# Patient Record
Sex: MALE | Race: WHITE | NOT HISPANIC OR LATINO | Employment: FULL TIME | URBAN - METROPOLITAN AREA
[De-identification: names, ages, dates, MRNs, and addresses within clinical notes are randomized per-mention and may not be internally consistent; named-entity substitution may affect disease eponyms.]

---

## 2012-11-20 LAB — CRC RECOMMENDATION EXT: NORMAL

## 2017-11-03 ENCOUNTER — HISTORICAL (OUTPATIENT)
Dept: LAB | Facility: HOSPITAL | Age: 52
End: 2017-11-03

## 2017-11-03 LAB
ABS NEUT (OLG): 2.62 X10(3)/MCL (ref 2.1–9.2)
ALBUMIN SERPL-MCNC: 4.4 GM/DL (ref 3.4–5)
ALBUMIN/GLOB SERPL: 1.5 {RATIO}
ALP SERPL-CCNC: 110 UNIT/L (ref 50–136)
ALT SERPL-CCNC: 75 UNIT/L (ref 12–78)
AST SERPL-CCNC: 35 UNIT/L (ref 15–37)
BASOPHILS # BLD AUTO: 0 X10(3)/MCL (ref 0–0.2)
BASOPHILS NFR BLD AUTO: 1 %
BILIRUB SERPL-MCNC: 1 MG/DL (ref 0.2–1)
BILIRUBIN DIRECT+TOT PNL SERPL-MCNC: 0.2 MG/DL (ref 0–0.2)
BILIRUBIN DIRECT+TOT PNL SERPL-MCNC: 0.8 MG/DL (ref 0–0.8)
BUN SERPL-MCNC: 16 MG/DL (ref 7–18)
CALCIUM SERPL-MCNC: 9.4 MG/DL (ref 8.5–10.1)
CHLORIDE SERPL-SCNC: 105 MMOL/L (ref 98–107)
CO2 SERPL-SCNC: 28 MMOL/L (ref 21–32)
CREAT SERPL-MCNC: 1.09 MG/DL (ref 0.7–1.3)
EOSINOPHIL # BLD AUTO: 0.1 X10(3)/MCL (ref 0–0.9)
EOSINOPHIL NFR BLD AUTO: 2 %
ERYTHROCYTE [DISTWIDTH] IN BLOOD BY AUTOMATED COUNT: 13.2 % (ref 11.5–17)
GLOBULIN SER-MCNC: 3 GM/DL (ref 2.4–3.5)
GLUCOSE SERPL-MCNC: 105 MG/DL (ref 74–106)
HCT VFR BLD AUTO: 50.1 % (ref 42–52)
HGB BLD-MCNC: 16.3 GM/DL (ref 14–18)
LYMPHOCYTES # BLD AUTO: 1.8 X10(3)/MCL (ref 0.6–4.6)
LYMPHOCYTES NFR BLD AUTO: 36 %
MCH RBC QN AUTO: 30.5 PG (ref 27–31)
MCHC RBC AUTO-ENTMCNC: 32.5 GM/DL (ref 33–36)
MCV RBC AUTO: 93.8 FL (ref 80–94)
MONOCYTES # BLD AUTO: 0.5 X10(3)/MCL (ref 0.1–1.3)
MONOCYTES NFR BLD AUTO: 10 %
NEUTROPHILS # BLD AUTO: 2.62 X10(3)/MCL (ref 2.1–9.2)
NEUTROPHILS NFR BLD AUTO: 51 %
PLATELET # BLD AUTO: 184 X10(3)/MCL (ref 130–400)
PMV BLD AUTO: 10.5 FL (ref 9.4–12.4)
POTASSIUM SERPL-SCNC: 4.5 MMOL/L (ref 3.5–5.1)
PROT SERPL-MCNC: 7.4 GM/DL (ref 6.4–8.2)
PSA SERPL-MCNC: 1.31 NG/ML (ref 0–4)
RBC # BLD AUTO: 5.34 X10(6)/MCL (ref 4.7–6.1)
SODIUM SERPL-SCNC: 143 MMOL/L (ref 136–145)
TSH SERPL-ACNC: 2.09 MIU/ML (ref 0.36–3.74)
WBC # SPEC AUTO: 5.1 X10(3)/MCL (ref 4.5–11.5)

## 2018-01-16 LAB
INFLUENZA A ANTIGEN, POC: POSITIVE
INFLUENZA B ANTIGEN, POC: NEGATIVE

## 2018-12-12 ENCOUNTER — HISTORICAL (OUTPATIENT)
Dept: LAB | Facility: HOSPITAL | Age: 53
End: 2018-12-12

## 2018-12-12 LAB
ABS NEUT (OLG): 2.76 X10(3)/MCL (ref 2.1–9.2)
ALBUMIN SERPL-MCNC: 3.9 GM/DL (ref 3.4–5)
ALBUMIN/GLOB SERPL: 1.3 RATIO (ref 1.1–2)
ALP SERPL-CCNC: 98 UNIT/L (ref 50–136)
ALT SERPL-CCNC: 48 UNIT/L (ref 12–78)
APPEARANCE, UA: CLEAR
AST SERPL-CCNC: 33 UNIT/L (ref 15–37)
BACTERIA SPEC CULT: ABNORMAL /HPF
BASOPHILS # BLD AUTO: 0 X10(3)/MCL (ref 0–0.2)
BASOPHILS NFR BLD AUTO: 1 %
BILIRUB SERPL-MCNC: 0.8 MG/DL (ref 0.2–1)
BILIRUB UR QL STRIP: NEGATIVE
BILIRUBIN DIRECT+TOT PNL SERPL-MCNC: 0.2 MG/DL (ref 0–0.5)
BILIRUBIN DIRECT+TOT PNL SERPL-MCNC: 0.6 MG/DL (ref 0–0.8)
BUN SERPL-MCNC: 14 MG/DL (ref 7–18)
CALCIUM SERPL-MCNC: 8.7 MG/DL (ref 8.5–10.1)
CHLORIDE SERPL-SCNC: 109 MMOL/L (ref 98–107)
CHOLEST SERPL-MCNC: 252 MG/DL (ref 0–200)
CHOLEST/HDLC SERPL: 5.4 {RATIO} (ref 0–5)
CO2 SERPL-SCNC: 26 MMOL/L (ref 21–32)
COLOR UR: ABNORMAL
CREAT SERPL-MCNC: 0.93 MG/DL (ref 0.7–1.3)
EOSINOPHIL # BLD AUTO: 0.2 X10(3)/MCL (ref 0–0.9)
EOSINOPHIL NFR BLD AUTO: 3 %
ERYTHROCYTE [DISTWIDTH] IN BLOOD BY AUTOMATED COUNT: 13.3 % (ref 11.5–17)
EST. AVERAGE GLUCOSE BLD GHB EST-MCNC: 114 MG/DL
GLOBULIN SER-MCNC: 2.9 GM/DL (ref 2.4–3.5)
GLUCOSE (UA): NEGATIVE
GLUCOSE SERPL-MCNC: 109 MG/DL (ref 74–106)
HBA1C MFR BLD: 5.6 % (ref 4.2–6.3)
HCT VFR BLD AUTO: 48.8 % (ref 42–52)
HDLC SERPL-MCNC: 47 MG/DL (ref 35–60)
HGB BLD-MCNC: 15.8 GM/DL (ref 14–18)
HGB UR QL STRIP: NEGATIVE
KETONES UR QL STRIP: NEGATIVE
LDLC SERPL CALC-MCNC: 143 MG/DL (ref 0–129)
LEUKOCYTE ESTERASE UR QL STRIP: NEGATIVE
LYMPHOCYTES # BLD AUTO: 1.6 X10(3)/MCL (ref 0.6–4.6)
LYMPHOCYTES NFR BLD AUTO: 32 %
MCH RBC QN AUTO: 29.9 PG (ref 27–31)
MCHC RBC AUTO-ENTMCNC: 32.4 GM/DL (ref 33–36)
MCV RBC AUTO: 92.2 FL (ref 80–94)
MONOCYTES # BLD AUTO: 0.4 X10(3)/MCL (ref 0.1–1.3)
MONOCYTES NFR BLD AUTO: 8 %
MUCOUS THREADS URNS QL MICRO: SLIGHT
NEUTROPHILS # BLD AUTO: 2.76 X10(3)/MCL (ref 2.1–9.2)
NEUTROPHILS NFR BLD AUTO: 56 %
NITRITE UR QL STRIP: NEGATIVE
PH UR STRIP: 5 [PH] (ref 5–9)
PLATELET # BLD AUTO: 165 X10(3)/MCL (ref 130–400)
PMV BLD AUTO: 10.8 FL (ref 9.4–12.4)
POTASSIUM SERPL-SCNC: 4.1 MMOL/L (ref 3.5–5.1)
PROT SERPL-MCNC: 6.8 GM/DL (ref 6.4–8.2)
PROT UR QL STRIP: NEGATIVE
PSA SERPL-MCNC: 1.53 NG/ML (ref 0–4)
RBC # BLD AUTO: 5.29 X10(6)/MCL (ref 4.7–6.1)
RBC #/AREA URNS HPF: ABNORMAL /HPF
SODIUM SERPL-SCNC: 143 MMOL/L (ref 136–145)
SP GR UR STRIP: 1.02 (ref 1–1.03)
SQUAMOUS EPITHELIAL, UA: ABNORMAL
TRIGL SERPL-MCNC: 311 MG/DL (ref 30–150)
TSH SERPL-ACNC: 1.82 MIU/L (ref 0.36–3.74)
UA WBC MAN: ABNORMAL /HPF
UROBILINOGEN UR STRIP-ACNC: 0.2
VLDLC SERPL CALC-MCNC: 62 MG/DL
WBC # SPEC AUTO: 4.9 X10(3)/MCL (ref 4.5–11.5)

## 2019-04-03 ENCOUNTER — HISTORICAL (OUTPATIENT)
Dept: LAB | Facility: HOSPITAL | Age: 54
End: 2019-04-03

## 2019-04-03 LAB
CHOLEST SERPL-MCNC: 257 MG/DL (ref 0–200)
CHOLEST/HDLC SERPL: 4.4 {RATIO} (ref 0–5)
HDLC SERPL-MCNC: 59 MG/DL (ref 35–60)
LDLC SERPL CALC-MCNC: 165 MG/DL (ref 0–129)
TRIGL SERPL-MCNC: 165 MG/DL (ref 30–150)
VLDLC SERPL CALC-MCNC: 33 MG/DL

## 2019-07-03 ENCOUNTER — HISTORICAL (OUTPATIENT)
Dept: LAB | Facility: HOSPITAL | Age: 54
End: 2019-07-03

## 2019-07-03 LAB
CHOLEST SERPL-MCNC: 174 MG/DL (ref 0–199)
CHOLEST/HDLC SERPL: 3 MG/DL (ref 0–8)
HDLC SERPL-MCNC: 68 MG/DL
LDLC SERPL CALC-MCNC: 81 MG/DL (ref 0–129)
TRIGL SERPL-MCNC: 126 MG/DL (ref 0–149)
VLDLC SERPL CALC-MCNC: 25 MG/DL

## 2020-06-04 ENCOUNTER — HISTORICAL (OUTPATIENT)
Dept: ADMINISTRATIVE | Facility: HOSPITAL | Age: 55
End: 2020-06-04

## 2020-06-04 LAB
ABS NEUT (OLG): 2.94 X10(3)/MCL (ref 2.1–9.2)
ALBUMIN SERPL-MCNC: 4.2 GM/DL (ref 3.5–5)
ALBUMIN/GLOB SERPL: 1.4 RATIO (ref 1.1–2)
ALP SERPL-CCNC: 123 UNIT/L (ref 40–150)
ALT SERPL-CCNC: 70 UNIT/L (ref 0–55)
APPEARANCE, UA: CLEAR
AST SERPL-CCNC: 40 UNIT/L (ref 5–34)
BACTERIA SPEC CULT: NORMAL /HPF
BASOPHILS # BLD AUTO: 0 X10(3)/MCL (ref 0–0.2)
BASOPHILS NFR BLD AUTO: 1 %
BILIRUB SERPL-MCNC: 0.6 MG/DL
BILIRUB UR QL STRIP: NEGATIVE
BILIRUBIN DIRECT+TOT PNL SERPL-MCNC: 0.2 MG/DL (ref 0–0.5)
BILIRUBIN DIRECT+TOT PNL SERPL-MCNC: 0.4 MG/DL (ref 0–0.8)
BUN SERPL-MCNC: 13.4 MG/DL (ref 8.4–25.7)
CALCIUM SERPL-MCNC: 9.7 MG/DL (ref 8.4–10.2)
CHLORIDE SERPL-SCNC: 109 MMOL/L (ref 98–107)
CHOLEST SERPL-MCNC: 188 MG/DL
CHOLEST/HDLC SERPL: 4 {RATIO} (ref 0–5)
CO2 SERPL-SCNC: 25 MMOL/L (ref 22–29)
COLOR UR: YELLOW
CREAT SERPL-MCNC: 0.83 MG/DL (ref 0.73–1.18)
EOSINOPHIL # BLD AUTO: 0.2 X10(3)/MCL (ref 0–0.9)
EOSINOPHIL NFR BLD AUTO: 3 %
ERYTHROCYTE [DISTWIDTH] IN BLOOD BY AUTOMATED COUNT: 13.1 % (ref 11.5–17)
EST. AVERAGE GLUCOSE BLD GHB EST-MCNC: 108.3 MG/DL
GLOBULIN SER-MCNC: 3 GM/DL (ref 2.4–3.5)
GLUCOSE (UA): NEGATIVE
GLUCOSE SERPL-MCNC: 111 MG/DL (ref 74–100)
HAV IGM SERPL QL IA: NONREACTIVE
HBA1C MFR BLD: 5.4 %
HBV CORE IGM SERPL QL IA: NONREACTIVE
HBV SURFACE AG SERPL QL IA: NONREACTIVE
HCT VFR BLD AUTO: 47.4 % (ref 42–52)
HCV AB SERPL QL IA: NONREACTIVE
HDLC SERPL-MCNC: 48 MG/DL (ref 35–60)
HEPATITIS PANEL INTERP: NORMAL
HGB BLD-MCNC: 15.3 GM/DL (ref 14–18)
HGB UR QL STRIP: NEGATIVE
KETONES UR QL STRIP: NEGATIVE
LDLC SERPL CALC-MCNC: 115 MG/DL (ref 50–140)
LEUKOCYTE ESTERASE UR QL STRIP: NEGATIVE
LYMPHOCYTES # BLD AUTO: 1.8 X10(3)/MCL (ref 0.6–4.6)
LYMPHOCYTES NFR BLD AUTO: 34 %
MCH RBC QN AUTO: 29.7 PG (ref 27–31)
MCHC RBC AUTO-ENTMCNC: 32.3 GM/DL (ref 33–36)
MCV RBC AUTO: 91.9 FL (ref 80–94)
MONOCYTES # BLD AUTO: 0.4 X10(3)/MCL (ref 0.1–1.3)
MONOCYTES NFR BLD AUTO: 7 %
NEUTROPHILS # BLD AUTO: 2.94 X10(3)/MCL (ref 2.1–9.2)
NEUTROPHILS NFR BLD AUTO: 55 %
NITRITE UR QL STRIP: NEGATIVE
PH UR STRIP: 5 [PH] (ref 5–9)
PLATELET # BLD AUTO: 170 X10(3)/MCL (ref 130–400)
PMV BLD AUTO: 11 FL (ref 9.4–12.4)
POTASSIUM SERPL-SCNC: 4.6 MMOL/L (ref 3.5–5.1)
PROT SERPL-MCNC: 7.2 GM/DL (ref 6.4–8.3)
PROT UR QL STRIP: NEGATIVE
RBC # BLD AUTO: 5.16 X10(6)/MCL (ref 4.7–6.1)
RBC #/AREA URNS HPF: NORMAL /[HPF]
SODIUM SERPL-SCNC: 141 MMOL/L (ref 136–145)
SP GR UR STRIP: 1.02 (ref 1–1.03)
SQUAMOUS EPITHELIAL, UA: NORMAL
TRIGL SERPL-MCNC: 126 MG/DL (ref 34–140)
TSH SERPL-ACNC: 0.84 UIU/ML (ref 0.35–4.94)
UROBILINOGEN UR STRIP-ACNC: 0.2
VLDLC SERPL CALC-MCNC: 25 MG/DL
WBC # SPEC AUTO: 5.4 X10(3)/MCL (ref 4.5–11.5)
WBC #/AREA URNS HPF: NORMAL /HPF

## 2021-08-09 ENCOUNTER — HISTORICAL (OUTPATIENT)
Dept: LAB | Facility: HOSPITAL | Age: 56
End: 2021-08-09

## 2021-08-09 LAB
ABS NEUT (OLG): 2.63 X10(3)/MCL (ref 2.1–9.2)
ALBUMIN SERPL-MCNC: 4.2 GM/DL (ref 3.5–5)
ALBUMIN/GLOB SERPL: 1.5 RATIO (ref 1.1–2)
ALP SERPL-CCNC: 119 UNIT/L (ref 40–150)
ALT SERPL-CCNC: 43 UNIT/L (ref 0–55)
AST SERPL-CCNC: 31 UNIT/L (ref 5–34)
BASOPHILS # BLD AUTO: 0.03 X10(3)/MCL (ref 0–0.2)
BASOPHILS NFR BLD AUTO: 0.7 % (ref 0–0.9)
BILIRUB SERPL-MCNC: 1 MG/DL (ref 0.2–1.2)
BILIRUBIN DIRECT+TOT PNL SERPL-MCNC: 0.3 MG/DL (ref 0–0.5)
BILIRUBIN DIRECT+TOT PNL SERPL-MCNC: 0.7 MG/DL (ref 0–0.8)
BUN SERPL-MCNC: 21.1 MG/DL (ref 8.4–25.7)
CALCIUM SERPL-MCNC: 9.6 MG/DL (ref 8.4–10.2)
CHLORIDE SERPL-SCNC: 107 MMOL/L (ref 98–107)
CHOLEST SERPL-MCNC: 178 MG/DL
CHOLEST/HDLC SERPL: 4 {RATIO} (ref 0–5)
CO2 SERPL-SCNC: 27 MMOL/L (ref 22–29)
CREAT SERPL-MCNC: 0.94 MG/DL (ref 0.72–1.25)
EOSINOPHIL # BLD AUTO: 0.13 X10(3)/MCL (ref 0–0.9)
EOSINOPHIL NFR BLD AUTO: 2.9 % (ref 0–6.5)
ERYTHROCYTE [DISTWIDTH] IN BLOOD BY AUTOMATED COUNT: 12.9 % (ref 11.5–17)
GLOBULIN SER-MCNC: 2.8 GM/DL (ref 2.4–3.5)
GLUCOSE SERPL-MCNC: 126 MG/DL (ref 74–100)
HCT VFR BLD AUTO: 47.8 % (ref 42–52)
HDLC SERPL-MCNC: 50 MG/DL (ref 40–60)
HGB BLD-MCNC: 15.6 GM/DL (ref 14–18)
IMM GRANULOCYTES # BLD AUTO: 0 10*3/UL (ref 0–0.02)
IMM GRANULOCYTES NFR BLD AUTO: 0 % (ref 0–0.43)
LDLC SERPL CALC-MCNC: 93 MG/DL (ref 50–140)
LYMPHOCYTES # BLD AUTO: 1.3 X10(3)/MCL (ref 0.6–4.6)
LYMPHOCYTES NFR BLD AUTO: 28.6 % (ref 16.2–38.3)
MCH RBC QN AUTO: 30.5 PG (ref 27–31)
MCHC RBC AUTO-ENTMCNC: 32.6 GM/DL (ref 33–36)
MCV RBC AUTO: 93.5 FL (ref 80–94)
MONOCYTES # BLD AUTO: 0.45 X10(3)/MCL (ref 0.1–1.3)
MONOCYTES NFR BLD AUTO: 9.9 % (ref 4.7–11.3)
NEUTROPHILS # BLD AUTO: 2.63 X10(3)/MCL (ref 2.1–9.2)
NEUTROPHILS NFR BLD AUTO: 57.9 % (ref 49.1–73.4)
NRBC BLD AUTO-RTO: 0 % (ref 0–0.2)
PLATELET # BLD AUTO: 166 X10(3)/MCL (ref 130–400)
PMV BLD AUTO: 10.2 FL (ref 7.4–10.4)
POTASSIUM SERPL-SCNC: 4.4 MMOL/L (ref 3.5–5.1)
PROT SERPL-MCNC: 7 GM/DL (ref 6.4–8.3)
PSA SERPL-MCNC: 1.58 NG/ML
RBC # BLD AUTO: 5.11 X10(6)/MCL (ref 4.7–6.1)
SODIUM SERPL-SCNC: 143 MMOL/L (ref 136–145)
TRIGL SERPL-MCNC: 173 MG/DL (ref 0–150)
TSH SERPL-ACNC: 0.77 UIU/ML (ref 0.35–4.94)
VLDLC SERPL CALC-MCNC: 35 MG/DL
WBC # SPEC AUTO: 4.5 X10(3)/MCL (ref 4.5–11.5)

## 2021-12-15 ENCOUNTER — HISTORICAL (OUTPATIENT)
Dept: RADIOLOGY | Facility: HOSPITAL | Age: 56
End: 2021-12-15

## 2022-04-12 ENCOUNTER — HISTORICAL (OUTPATIENT)
Dept: ADMINISTRATIVE | Facility: HOSPITAL | Age: 57
End: 2022-04-12
Payer: COMMERCIAL

## 2022-04-30 VITALS
BODY MASS INDEX: 28.73 KG/M2 | SYSTOLIC BLOOD PRESSURE: 154 MMHG | HEIGHT: 69 IN | OXYGEN SATURATION: 98 % | DIASTOLIC BLOOD PRESSURE: 90 MMHG | WEIGHT: 194 LBS

## 2022-06-13 ENCOUNTER — OFFICE VISIT (OUTPATIENT)
Dept: FAMILY MEDICINE | Facility: CLINIC | Age: 57
End: 2022-06-13
Payer: COMMERCIAL

## 2022-06-13 VITALS
HEIGHT: 67 IN | OXYGEN SATURATION: 96 % | BODY MASS INDEX: 28.64 KG/M2 | SYSTOLIC BLOOD PRESSURE: 126 MMHG | TEMPERATURE: 99 F | WEIGHT: 182.5 LBS | RESPIRATION RATE: 18 BRPM | HEART RATE: 80 BPM | DIASTOLIC BLOOD PRESSURE: 82 MMHG

## 2022-06-13 DIAGNOSIS — F90.9 ATTENTION DEFICIT HYPERACTIVITY DISORDER (ADHD), UNSPECIFIED ADHD TYPE: Primary | ICD-10-CM

## 2022-06-13 DIAGNOSIS — N52.9 ERECTILE DYSFUNCTION, UNSPECIFIED ERECTILE DYSFUNCTION TYPE: ICD-10-CM

## 2022-06-13 DIAGNOSIS — M79.671 RIGHT FOOT PAIN: ICD-10-CM

## 2022-06-13 PROBLEM — E78.5 HYPERLIPIDEMIA: Status: ACTIVE | Noted: 2022-06-13

## 2022-06-13 PROBLEM — Z00.00 WELLNESS EXAMINATION: Status: ACTIVE | Noted: 2022-06-13

## 2022-06-13 PROCEDURE — 3008F PR BODY MASS INDEX (BMI) DOCUMENTED: ICD-10-PCS | Mod: CPTII,,, | Performed by: FAMILY MEDICINE

## 2022-06-13 PROCEDURE — 3074F PR MOST RECENT SYSTOLIC BLOOD PRESSURE < 130 MM HG: ICD-10-PCS | Mod: CPTII,,, | Performed by: FAMILY MEDICINE

## 2022-06-13 PROCEDURE — 99213 PR OFFICE/OUTPT VISIT, EST, LEVL III, 20-29 MIN: ICD-10-PCS | Mod: ,,, | Performed by: FAMILY MEDICINE

## 2022-06-13 PROCEDURE — 3079F PR MOST RECENT DIASTOLIC BLOOD PRESSURE 80-89 MM HG: ICD-10-PCS | Mod: CPTII,,, | Performed by: FAMILY MEDICINE

## 2022-06-13 PROCEDURE — 3008F BODY MASS INDEX DOCD: CPT | Mod: CPTII,,, | Performed by: FAMILY MEDICINE

## 2022-06-13 PROCEDURE — 3074F SYST BP LT 130 MM HG: CPT | Mod: CPTII,,, | Performed by: FAMILY MEDICINE

## 2022-06-13 PROCEDURE — 99213 OFFICE O/P EST LOW 20 MIN: CPT | Mod: ,,, | Performed by: FAMILY MEDICINE

## 2022-06-13 PROCEDURE — 3079F DIAST BP 80-89 MM HG: CPT | Mod: CPTII,,, | Performed by: FAMILY MEDICINE

## 2022-06-13 RX ORDER — SILDENAFIL 50 MG/1
50 TABLET, FILM COATED ORAL DAILY
COMMUNITY
Start: 2021-12-18 | End: 2022-06-13 | Stop reason: SDUPTHER

## 2022-06-13 RX ORDER — SILDENAFIL 100 MG/1
100 TABLET, FILM COATED ORAL
COMMUNITY
Start: 2022-02-21 | End: 2022-06-13

## 2022-06-13 RX ORDER — ATORVASTATIN CALCIUM 20 MG/1
20 TABLET, FILM COATED ORAL DAILY
COMMUNITY
Start: 2022-06-11 | End: 2023-04-19 | Stop reason: SDUPTHER

## 2022-06-13 RX ORDER — MELOXICAM 15 MG/1
15 TABLET ORAL DAILY
Qty: 30 TABLET | Refills: 5 | Status: SHIPPED | OUTPATIENT
Start: 2022-06-13

## 2022-06-13 RX ORDER — DEXTROAMPHETAMINE SACCHARATE, AMPHETAMINE ASPARTATE, DEXTROAMPHETAMINE SULFATE AND AMPHETAMINE SULFATE 5; 5; 5; 5 MG/1; MG/1; MG/1; MG/1
1 TABLET ORAL EVERY MORNING
COMMUNITY
Start: 2022-05-16 | End: 2022-06-13 | Stop reason: SDUPTHER

## 2022-06-13 RX ORDER — DEXTROAMPHETAMINE SACCHARATE, AMPHETAMINE ASPARTATE, DEXTROAMPHETAMINE SULFATE AND AMPHETAMINE SULFATE 5; 5; 5; 5 MG/1; MG/1; MG/1; MG/1
1 TABLET ORAL EVERY MORNING
Qty: 30 TABLET | Refills: 0 | Status: SHIPPED | OUTPATIENT
Start: 2022-06-15 | End: 2022-07-15

## 2022-06-13 RX ORDER — SILDENAFIL 50 MG/1
50 TABLET, FILM COATED ORAL DAILY PRN
Qty: 5 TABLET | Refills: 11 | Status: SHIPPED | OUTPATIENT
Start: 2022-06-13 | End: 2023-01-03 | Stop reason: SDUPTHER

## 2022-06-13 RX ORDER — DEXTROAMPHETAMINE SACCHARATE, AMPHETAMINE ASPARTATE, DEXTROAMPHETAMINE SULFATE AND AMPHETAMINE SULFATE 5; 5; 5; 5 MG/1; MG/1; MG/1; MG/1
1 TABLET ORAL EVERY MORNING
Qty: 30 TABLET | Refills: 0 | Status: SHIPPED | OUTPATIENT
Start: 2022-08-14 | End: 2022-09-13 | Stop reason: SDUPTHER

## 2022-06-13 RX ORDER — DEXTROAMPHETAMINE SACCHARATE, AMPHETAMINE ASPARTATE, DEXTROAMPHETAMINE SULFATE AND AMPHETAMINE SULFATE 5; 5; 5; 5 MG/1; MG/1; MG/1; MG/1
1 TABLET ORAL EVERY MORNING
Qty: 30 TABLET | Refills: 0 | Status: SHIPPED | OUTPATIENT
Start: 2022-07-15 | End: 2022-08-14

## 2022-06-13 NOTE — PROGRESS NOTES
"Subjective:        Patient ID: Ankur Alexander is a 57 y.o. male.    Chief Complaint: Medication Refill (ADHD)      Patient presents to the clinic unaccompanied for ADHD follow-up.  He is due for his wellness visit in October    He was evaluated by Danny Garvin January 2022 and diagnosis of ADHD was confirmed.  He is currently on Adderall 20 mg once daily.  He does not take his medication on the weekends usually.  He feels as though the medication is helping him focus and would like to continue on his current dose.  He has 2 sons who have ADHD and are on medication    He reports right foot pain.  He states he has seen someone in the past for this and is on Mobic 15 mg as needed.  He is requesting a refill.    He has a history of erectile dysfunction and is currently on Viagra 50 mg daily as needed.  He would like a paper prescription refill.  Levitra was too expensive    .  He has a history of hyperlipidemia and is on atorvastatin.  His cardiologist is Dr. Esteban    He had a colonoscopy November 2012 with repeat recommended in 10 years.    Review of Systems   Constitutional: Negative.    HENT: Negative.    Eyes: Negative.    Respiratory: Negative.    Cardiovascular: Negative.    Gastrointestinal: Negative.    Endocrine: Negative.    Genitourinary: Negative.    Musculoskeletal: Positive for arthralgias.   Skin: Negative.    Allergic/Immunologic: Negative.    Neurological: Negative.    Hematological: Negative.    Psychiatric/Behavioral: Negative.    All other systems reviewed and are negative.        Review of patient's allergies indicates:  No Known Allergies   Vitals:    06/13/22 1611   BP: 126/82   BP Location: Left arm   Pulse: 80   Resp: 18   Temp: 98.8 °F (37.1 °C)   SpO2: 96%   Weight: 82.8 kg (182 lb 8 oz)   Height: 5' 7" (1.702 m)      Social History     Socioeconomic History    Marital status:    Tobacco Use    Smoking status: Never Smoker    Smokeless tobacco: Never Used   Substance and Sexual " Activity    Alcohol use: Yes     Comment: socially    Drug use: Never    Sexual activity: Yes      History reviewed. No pertinent family history.       Objective:     Physical Exam  Vitals reviewed.   Constitutional:       Appearance: Normal appearance. He is normal weight.   HENT:      Head: Normocephalic.      Nose: Nose normal.      Mouth/Throat:      Mouth: Mucous membranes are moist.      Pharynx: Oropharynx is clear.   Eyes:      Extraocular Movements: Extraocular movements intact.   Cardiovascular:      Rate and Rhythm: Normal rate and regular rhythm.   Pulmonary:      Effort: Pulmonary effort is normal.      Breath sounds: Normal breath sounds.   Musculoskeletal:         General: Normal range of motion.   Skin:     General: Skin is warm and dry.   Neurological:      General: No focal deficit present.      Mental Status: He is alert and oriented to person, place, and time. Mental status is at baseline.   Psychiatric:         Mood and Affect: Mood normal.       Current Outpatient Medications on File Prior to Visit   Medication Sig Dispense Refill    [DISCONTINUED] sildenafiL (VIAGRA) 100 MG tablet Take 100 mg by mouth.      atorvastatin (LIPITOR) 20 MG tablet Take 20 mg by mouth once daily.      [DISCONTINUED] dextroamphetamine-amphetamine (ADDERALL) 20 mg tablet Take 1 tablet by mouth every morning.      [DISCONTINUED] sildenafiL (VIAGRA) 50 MG tablet Take 50 mg by mouth once daily.       No current facility-administered medications on file prior to visit.     Health Maintenance   Topic Date Due    TETANUS VACCINE  Never done    Lipid Panel  12/15/2026    Hepatitis C Screening  Completed      Results for orders placed or performed in visit on 08/09/21   Comprehensive Metabolic Panel   Result Value Ref Range    Albumin/Globulin Ratio 1.5 1.1 - 2.0 ratio    Alanine Aminotransferase 43 0 - 55 unit/L    Aspartate Aminotransferase 31 5 - 34 unit/L    Albumin Level 4.2 3.5 - 5.0 gm/dL    Alkaline  Phosphatase 119 40 - 150 unit/L    Bilirubin Direct 0.3 0.0 - 0.5 mg/dL    Bilirubin Total 1.0 0.2 - 1.2 mg/dL    Blood Urea Nitrogen 21.1 8.4 - 25.7 mg/dL    Bilirubin Indirect 0.70 0.00 - 0.80 mg/dL    Carbon Dioxide 27 22 - 29 mmol/L    Calcium Level Total 9.6 8.4 - 10.2 mg/dL    Chloride 107 98 - 107 mmol/L    Creatinine 0.94 0.72 - 1.25 mg/dL    Globulin 2.8 2.4 - 3.5 gm/dL    Glucose Level 126 (H) 74 - 100 mg/dL    Potassium Level 4.4 3.5 - 5.1 mmol/L    Sodium Level 143 136 - 145 mmol/L    Protein Total 7.0 6.4 - 8.3 gm/dL   Lipid Panel   Result Value Ref Range    Cholesterol Total 178 <<=200 mg/dL    Cholesterol/HDL Ratio 4 0 - 5    HDL Cholesterol 50 40 - 60 mg/dL    LDL Cholesterol 93.00 50.00 - 140.00 mg/dL    Triglyceride 173 (H) 0 - 150 mg/dL    Very Low Density Lipoprotein 35    PSA, Total (Diagnostic)   Result Value Ref Range    Prostate Specific Antigen 1.58 <<=4.00 ng/mL   TSH   Result Value Ref Range    Thyroid Stimulating Hormone 0.7734 0.3500 - 4.9400 uIU/mL   GFR, Estimated   Result Value Ref Range    Estimated GFR-Non African American >60 mL/min/1.73 m2    Estimated GFR- >60    Differential   Result Value Ref Range    Lymph # 1.30 0.60 - 4.60 x10(3)/mcL    Mono # 0.45 0.10 - 1.30 x10(3)/mcL    Eos # 0.13 0.00 - 0.90 x10(3)/mcL    Baso # 0.03 0.00 - 0.20 x10(3)/mcL    Basophil % 0.7 0.0 - 0.9 %    Neut # 2.63 2.10 - 9.20 x10(3)/mcL    Lymph % 28.6 16.2 - 38.3 %    IG% 0.000 0.000 - 0.430 %    Mono % 9.9 4.7 - 11.3 %    NRBC% 0.0 0.0 - 0.2 %    Eos % 2.9 0.0 - 6.5 %    Neut % 57.9 49.1 - 73.4 %    IG# 0.0000 0.0000 - 0.0155   CBC Auto Differential   Result Value Ref Range    Abs Neut 2.63 2.10 - 9.20 x10(3)/mcL    MCV 93.5 80.0 - 94.0 fL    MPV 10.2 7.4 - 10.4 fL    MCH 30.5 27.0 - 31.0 pg    MCHC 32.6 (L) 33.0 - 36.0 gm/dL    Hgb 15.6 14.0 - 18.0 gm/dL    Hct 47.8 42.0 - 52.0 %    WBC 4.5 4.5 - 11.5 x10(3)/mcL    RBC 5.11 4.70 - 6.10 x10(6)/mcL    RDW 12.9 11.5 - 17.0 %     Platelet 166 130 - 400 x10(3)/mcL          Assessment & Plan:     Active Problem List with Overview Notes    Diagnosis Date Noted    Wellness examination 06/13/2022    Attention deficit hyperactivity disorder (ADHD) 06/13/2022    Erectile dysfunction 06/13/2022    Hyperlipidemia 06/13/2022    Right foot pain 06/13/2022       1. Attention deficit hyperactivity disorder (ADHD), unspecified ADHD type  Assessment & Plan:  [facility] reviewed. Controlled substance policy signed 2/2022.  Refill sent in as requested. Reminded patient this is a controlled medication and must be seen q3 months while on.  Patient is agreeable to plan and verbalized understanding        2. Erectile dysfunction, unspecified erectile dysfunction type  Assessment & Plan:  viagra refill printed and given to patient    Orders:  -     sildenafiL (VIAGRA) 50 MG tablet    3. Right foot pain  Assessment & Plan:  mobic refilled as requested. Take with food      Other orders  -     dextroamphetamine-amphetamine (ADDERALL) 20 mg tablet  -     dextroamphetamine-amphetamine (ADDERALL) 20 mg tablet  -     dextroamphetamine-amphetamine (ADDERALL) 20 mg tablet  -     meloxicam (MOBIC) 15 MG tablet       Follow up in about 3 months (around 9/13/2022) for ADHD, Virtual Visit.

## 2022-06-13 NOTE — ASSESSMENT & PLAN NOTE
[facility] reviewed. Controlled substance policy signed 2/2022.  Refill sent in as requested. Reminded patient this is a controlled medication and must be seen q3 months while on.  Patient is agreeable to plan and verbalized understanding

## 2022-06-29 ENCOUNTER — PATIENT OUTREACH (OUTPATIENT)
Dept: ADMINISTRATIVE | Facility: HOSPITAL | Age: 57
End: 2022-06-29
Payer: COMMERCIAL

## 2022-06-29 NOTE — PROGRESS NOTES
Population Health. Out Reach.  The following record(s)  below were uploaded for Health Maintenance .    11/20/12 COLONOSCOPY

## 2022-08-18 ENCOUNTER — TELEPHONE (OUTPATIENT)
Dept: FAMILY MEDICINE | Facility: CLINIC | Age: 57
End: 2022-08-18
Payer: COMMERCIAL

## 2022-08-18 NOTE — TELEPHONE ENCOUNTER
Rx sent to [facility] pharmacy on 6.13.22 at 4:31pm to be filled on 8.14.22. I explained the process to him. Patient voiced understanding. Madelyn

## 2022-08-18 NOTE — TELEPHONE ENCOUNTER
----- Message from Melvin Pascual sent at 8/18/2022  9:08 AM CDT -----  .Type:  RX Refill Request    Who Called: Ankur  Refill or New Rx:Refill  RX Name and Strength: Adderoll 20 mg  How is the patient currently taking it? (ex. 1XDay): 1 x day  Is this a 30 day or 90 day RX:  Preferred Pharmacy with phone number: [facility] in [place]  Local or Mail Order: Local  Ordering Provider: Roxie  Would the patient rather a call back or a response via [facility]?   Best Call Back Number: [phone]  Additional Information: Please call him when refill is completed.

## 2022-09-12 PROBLEM — Z00.00 WELLNESS EXAMINATION: Status: RESOLVED | Noted: 2022-06-13 | Resolved: 2022-09-12

## 2022-09-13 ENCOUNTER — TELEPHONE (OUTPATIENT)
Dept: FAMILY MEDICINE | Facility: CLINIC | Age: 57
End: 2022-09-13

## 2022-09-13 ENCOUNTER — OFFICE VISIT (OUTPATIENT)
Dept: FAMILY MEDICINE | Facility: CLINIC | Age: 57
End: 2022-09-13
Payer: COMMERCIAL

## 2022-09-13 VITALS — BODY MASS INDEX: 28.19 KG/M2 | WEIGHT: 180 LBS

## 2022-09-13 DIAGNOSIS — F90.9 ATTENTION DEFICIT HYPERACTIVITY DISORDER (ADHD), UNSPECIFIED ADHD TYPE: Primary | ICD-10-CM

## 2022-09-13 DIAGNOSIS — Z12.11 COLON CANCER SCREENING: ICD-10-CM

## 2022-09-13 PROCEDURE — 1160F PR REVIEW ALL MEDS BY PRESCRIBER/CLIN PHARMACIST DOCUMENTED: ICD-10-PCS | Mod: CPTII,95,, | Performed by: FAMILY MEDICINE

## 2022-09-13 PROCEDURE — 99213 OFFICE O/P EST LOW 20 MIN: CPT | Mod: 95,,, | Performed by: FAMILY MEDICINE

## 2022-09-13 PROCEDURE — 3008F PR BODY MASS INDEX (BMI) DOCUMENTED: ICD-10-PCS | Mod: CPTII,95,, | Performed by: FAMILY MEDICINE

## 2022-09-13 PROCEDURE — 1159F MED LIST DOCD IN RCRD: CPT | Mod: CPTII,95,, | Performed by: FAMILY MEDICINE

## 2022-09-13 PROCEDURE — 99213 PR OFFICE/OUTPT VISIT, EST, LEVL III, 20-29 MIN: ICD-10-PCS | Mod: 95,,, | Performed by: FAMILY MEDICINE

## 2022-09-13 PROCEDURE — 3008F BODY MASS INDEX DOCD: CPT | Mod: CPTII,95,, | Performed by: FAMILY MEDICINE

## 2022-09-13 PROCEDURE — 1159F PR MEDICATION LIST DOCUMENTED IN MEDICAL RECORD: ICD-10-PCS | Mod: CPTII,95,, | Performed by: FAMILY MEDICINE

## 2022-09-13 PROCEDURE — 1160F RVW MEDS BY RX/DR IN RCRD: CPT | Mod: CPTII,95,, | Performed by: FAMILY MEDICINE

## 2022-09-13 RX ORDER — DEXTROAMPHETAMINE SACCHARATE, AMPHETAMINE ASPARTATE, DEXTROAMPHETAMINE SULFATE AND AMPHETAMINE SULFATE 5; 5; 5; 5 MG/1; MG/1; MG/1; MG/1
1 TABLET ORAL EVERY MORNING
Qty: 30 TABLET | Refills: 0 | Status: SHIPPED | OUTPATIENT
Start: 2022-11-17 | End: 2022-10-07

## 2022-09-13 RX ORDER — BETAMETHASONE DIPROPIONATE 0.5 MG/G
CREAM TOPICAL 2 TIMES DAILY
COMMUNITY
Start: 2022-08-05 | End: 2023-07-19

## 2022-09-13 RX ORDER — METRONIDAZOLE 7.5 MG/G
CREAM TOPICAL DAILY
COMMUNITY
Start: 2022-06-29

## 2022-09-13 RX ORDER — DEXTROAMPHETAMINE SACCHARATE, AMPHETAMINE ASPARTATE, DEXTROAMPHETAMINE SULFATE AND AMPHETAMINE SULFATE 5; 5; 5; 5 MG/1; MG/1; MG/1; MG/1
1 TABLET ORAL EVERY MORNING
Qty: 30 TABLET | Refills: 0 | Status: SHIPPED | OUTPATIENT
Start: 2022-10-17 | End: 2022-09-13 | Stop reason: SDUPTHER

## 2022-09-13 RX ORDER — DEXTROAMPHETAMINE SACCHARATE, AMPHETAMINE ASPARTATE, DEXTROAMPHETAMINE SULFATE AND AMPHETAMINE SULFATE 5; 5; 5; 5 MG/1; MG/1; MG/1; MG/1
1 TABLET ORAL EVERY MORNING
Qty: 30 TABLET | Refills: 0 | Status: SHIPPED | OUTPATIENT
Start: 2022-09-17 | End: 2022-10-07 | Stop reason: SDUPTHER

## 2022-09-13 RX ORDER — DEXTROAMPHETAMINE SACCHARATE, AMPHETAMINE ASPARTATE, DEXTROAMPHETAMINE SULFATE AND AMPHETAMINE SULFATE 5; 5; 5; 5 MG/1; MG/1; MG/1; MG/1
1 TABLET ORAL EVERY MORNING
Qty: 30 TABLET | Refills: 0 | Status: SHIPPED | OUTPATIENT
Start: 2022-09-17 | End: 2022-09-13 | Stop reason: SDUPTHER

## 2022-09-13 RX ORDER — DEXTROAMPHETAMINE SACCHARATE, AMPHETAMINE ASPARTATE, DEXTROAMPHETAMINE SULFATE AND AMPHETAMINE SULFATE 5; 5; 5; 5 MG/1; MG/1; MG/1; MG/1
1 TABLET ORAL EVERY MORNING
Qty: 30 TABLET | Refills: 0 | Status: SHIPPED | OUTPATIENT
Start: 2022-10-17 | End: 2022-10-07

## 2022-09-13 RX ORDER — DEXTROAMPHETAMINE SACCHARATE, AMPHETAMINE ASPARTATE, DEXTROAMPHETAMINE SULFATE AND AMPHETAMINE SULFATE 5; 5; 5; 5 MG/1; MG/1; MG/1; MG/1
1 TABLET ORAL EVERY MORNING
Qty: 30 TABLET | Refills: 0 | Status: SHIPPED | OUTPATIENT
Start: 2022-11-17 | End: 2022-09-13 | Stop reason: SDUPTHER

## 2022-09-13 NOTE — PROGRESS NOTES
Subjective:        Patient ID: Ankur Alexander is a 57 y.o. male.    Chief Complaint: Follow-up (Med refill, Adderall working well, pathogen test panel concerns, pt states that he has taken at home covid test and he was negative, pt also states that he has some spots all over him he has seen a dermatologist they prescribed you a cream to help clear it but it hasn't cleared all the way up just yet, )      Patient presents via telemed unaccompanied for ADHD follow-up.  He is due for his wellness visit in October     He was evaluated by Danny Garvin January 2022 and diagnosis of ADHD was confirmed.  He is currently on Adderall 20 mg once daily.  He does not take his medication on the weekends usually.  He feels as though the medication is helping him focus and would like to continue on his current dose.  He has 2 sons who have ADHD and are on medication    He states he is going on a cruise this week.  He may come in to the office for a steroid injection.  He will call us to schedule.  He has right foot pain.  He states he has seen someone in the past for this and is on Mobic 15 mg as needed.       He has a history of erectile dysfunction and is currently on Viagra 50 mg daily as needed.  Levitra was too expensive     He has a history of hyperlipidemia and is on atorvastatin.  His cardiologist is Dr. Esteban     He had a colonoscopy November 2012 with dr. Damon with repeat recommended in 10 years        The patient location is: home  The chief complaint leading to consultation is: adhd follow up    Visit type: audiovisual    Face to Face time with patient: 10 min  15 minutes of total time spent on the encounter, which includes face to face time and non-face to face time preparing to see the patient (eg, review of tests), Obtaining and/or reviewing separately obtained history, Documenting clinical information in the electronic or other health record, Independently interpreting results (not separately reported)  and communicating results to the patient/family/caregiver, or Care coordination (not separately reported).         Each patient to whom he or she provides medical services by telemedicine is:  (1) informed of the relationship between the physician and patient and the respective role of any other health care provider with respect to management of the patient; and (2) notified that he or she may decline to receive medical services by telemedicine and may withdraw from such care at any time.    Notes:         Review of Systems   Constitutional: Negative.    HENT: Negative.     Eyes: Negative.    Respiratory: Negative.     Cardiovascular: Negative.    Gastrointestinal: Negative.    Endocrine: Negative.    Genitourinary: Negative.    Musculoskeletal: Negative.    Skin: Negative.    Allergic/Immunologic: Negative.    Neurological: Negative.    Hematological: Negative.    Psychiatric/Behavioral: Negative.     All other systems reviewed and are negative.      Review of patient's allergies indicates:  No Known Allergies   Vitals:    09/13/22 1043   Weight: 81.6 kg (180 lb)      Social History     Socioeconomic History    Marital status:    Tobacco Use    Smoking status: Never    Smokeless tobacco: Never   Substance and Sexual Activity    Alcohol use: Yes     Comment: socially    Drug use: Never    Sexual activity: Yes      History reviewed. No pertinent family history.       Objective:     Physical Exam  Vitals and nursing note reviewed.   Constitutional:       Appearance: Normal appearance. He is obese.   HENT:      Head: Normocephalic and atraumatic.   Neurological:      General: No focal deficit present.      Mental Status: He is alert and oriented to person, place, and time. Mental status is at baseline.   Psychiatric:         Mood and Affect: Mood normal.     Current Outpatient Medications on File Prior to Visit   Medication Sig Dispense Refill    atorvastatin (LIPITOR) 20 MG tablet Take 20 mg by mouth once  daily.      meloxicam (MOBIC) 15 MG tablet Take 1 tablet (15 mg total) by mouth once daily. With food as needed for pain 30 tablet 5    sildenafiL (VIAGRA) 50 MG tablet Take 1 tablet (50 mg total) by mouth daily as needed for Erectile Dysfunction. 5 tablet 11    [DISCONTINUED] dextroamphetamine-amphetamine (ADDERALL) 20 mg tablet Take 1 tablet by mouth every morning. 30 tablet 0    augmented betamethasone dipropionate (DIPROLENE-AF) 0.05 % cream Apply topically 2 (two) times daily.      metronidazole 0.75% (METROCREAM) 0.75 % Crea Apply topically once daily.       No current facility-administered medications on file prior to visit.     Health Maintenance   Topic Date Due    Lipid Panel  12/15/2026    TETANUS VACCINE  04/03/2029    Hepatitis C Screening  Completed      Results for orders placed or performed in visit on 06/29/22    COLONOSCOPY   Result Value Ref Range    CRC Recommendation External Repeat colonoscopy in 10 years           Assessment & Plan:     Active Problem List with Overview Notes    Diagnosis Date Noted    Colon cancer screening 09/13/2022    Wellness examination 06/13/2022    Attention deficit hyperactivity disorder (ADHD) 06/13/2022    Erectile dysfunction 06/13/2022    Hyperlipidemia 06/13/2022    Right foot pain 06/13/2022       1. Attention deficit hyperactivity disorder (ADHD), unspecified ADHD type  Assessment & Plan:  [facility] reviewed. Controlled substance policy signed 2/2022.  Refill sent in as requested. Reminded patient this is a controlled medication and must be seen q3 months while on.  Patient is agreeable to plan and verbalized understanding        2. Colon cancer screening  Assessment & Plan:  Repeat colonoscopy due 11/2022 with dr. Damon. Referral placed    Orders:  -     Ambulatory referral/consult to Gastroenterology    Other orders  -     dextroamphetamine-amphetamine (ADDERALL) 20 mg tablet  -     dextroamphetamine-amphetamine (ADDERALL) 20 mg tablet  -      dextroamphetamine-amphetamine (ADDERALL) 20 mg tablet       Follow up in about 3 months (around 12/13/2022) for ADHD, Virtual Visit.

## 2022-09-13 NOTE — TELEPHONE ENCOUNTER
----- Message from Juan J Alvarez MA sent at 9/13/2022  1:15 PM CDT -----  Regarding: Med cancelation/ change pharmacy  Called pharmacy they are unable to get adderall at this time also not sure if they would be able to have it before the pts refill date.    Called pt he states that the medication can be sent to [facility] on [address] [facility] [address].   ----- Message -----  From: Olinda Eller  Sent: 9/13/2022  11:38 AM CDT  To: [facility] Staff  Subject: call back                                        .Type:  Needs Medical Advice    Who Called: [facility]   Symptoms (please be specific):    How long has patient had these symptoms:    Pharmacy name and phone #:    Would the patient rather a call back or a response via [facility]? Call back   Best Call Back Number: [phone]  Additional Information: pharmacy needs permission to cancel 3 prescriptions for the patient           
Addressed pt advised.   
I have signed for the following meds.  Please call the patient to inform about any medications that were sent.      Medications Ordered This Encounter   Medications    dextroamphetamine-amphetamine (ADDERALL) 20 mg tablet     Sig: Take 1 tablet by mouth every morning.     Dispense:  30 tablet     Refill:  0    dextroamphetamine-amphetamine (ADDERALL) 20 mg tablet     Sig: Take 1 tablet by mouth every morning.     Dispense:  30 tablet     Refill:  0    dextroamphetamine-amphetamine (ADDERALL) 20 mg tablet     Sig: Take 1 tablet by mouth every morning.     Dispense:  30 tablet     Refill:  0     
Pt aware pharmacy switched.   
Vital Signs Last 24 Hrs  T(C): 36.1 (24 Feb 2020 15:50), Max: 37.1 (23 Feb 2020 20:57)  T(F): 97 (24 Feb 2020 15:50), Max: 98.8 (23 Feb 2020 20:57)  HR: 81 (24 Feb 2020 15:50) (73 - 88)  BP: 102/56 (24 Feb 2020 15:50) (95/54 - 112/63)  BP(mean): --  RR: 18 (24 Feb 2020 14:14) (18 - 18)  SpO2: 98% (24 Feb 2020 05:46) (98% - 98%)

## 2022-09-22 ENCOUNTER — HISTORICAL (OUTPATIENT)
Dept: ADMINISTRATIVE | Facility: HOSPITAL | Age: 57
End: 2022-09-22
Payer: COMMERCIAL

## 2022-10-07 ENCOUNTER — OFFICE VISIT (OUTPATIENT)
Dept: FAMILY MEDICINE | Facility: CLINIC | Age: 57
End: 2022-10-07
Payer: COMMERCIAL

## 2022-10-07 VITALS
SYSTOLIC BLOOD PRESSURE: 130 MMHG | TEMPERATURE: 98 F | BODY MASS INDEX: 28.11 KG/M2 | DIASTOLIC BLOOD PRESSURE: 84 MMHG | OXYGEN SATURATION: 97 % | RESPIRATION RATE: 16 BRPM | HEART RATE: 76 BPM | WEIGHT: 179.5 LBS

## 2022-10-07 DIAGNOSIS — Z00.00 WELLNESS EXAMINATION: Primary | ICD-10-CM

## 2022-10-07 DIAGNOSIS — Z12.11 COLON CANCER SCREENING: ICD-10-CM

## 2022-10-07 DIAGNOSIS — Z12.5 PROSTATE CANCER SCREENING: ICD-10-CM

## 2022-10-07 DIAGNOSIS — N52.9 ERECTILE DYSFUNCTION, UNSPECIFIED ERECTILE DYSFUNCTION TYPE: ICD-10-CM

## 2022-10-07 DIAGNOSIS — E78.5 HYPERLIPIDEMIA, UNSPECIFIED HYPERLIPIDEMIA TYPE: ICD-10-CM

## 2022-10-07 DIAGNOSIS — M79.671 RIGHT FOOT PAIN: ICD-10-CM

## 2022-10-07 DIAGNOSIS — F90.9 ATTENTION DEFICIT HYPERACTIVITY DISORDER (ADHD), UNSPECIFIED ADHD TYPE: ICD-10-CM

## 2022-10-07 PROCEDURE — 1160F RVW MEDS BY RX/DR IN RCRD: CPT | Mod: CPTII,,, | Performed by: FAMILY MEDICINE

## 2022-10-07 PROCEDURE — 3079F DIAST BP 80-89 MM HG: CPT | Mod: CPTII,,, | Performed by: FAMILY MEDICINE

## 2022-10-07 PROCEDURE — 99396 PREV VISIT EST AGE 40-64: CPT | Mod: ,,, | Performed by: FAMILY MEDICINE

## 2022-10-07 PROCEDURE — 1159F MED LIST DOCD IN RCRD: CPT | Mod: CPTII,,, | Performed by: FAMILY MEDICINE

## 2022-10-07 PROCEDURE — 3008F BODY MASS INDEX DOCD: CPT | Mod: CPTII,,, | Performed by: FAMILY MEDICINE

## 2022-10-07 PROCEDURE — 1160F PR REVIEW ALL MEDS BY PRESCRIBER/CLIN PHARMACIST DOCUMENTED: ICD-10-PCS | Mod: CPTII,,, | Performed by: FAMILY MEDICINE

## 2022-10-07 PROCEDURE — 99396 PR PREVENTIVE VISIT,EST,40-64: ICD-10-PCS | Mod: ,,, | Performed by: FAMILY MEDICINE

## 2022-10-07 PROCEDURE — 3008F PR BODY MASS INDEX (BMI) DOCUMENTED: ICD-10-PCS | Mod: CPTII,,, | Performed by: FAMILY MEDICINE

## 2022-10-07 PROCEDURE — 1159F PR MEDICATION LIST DOCUMENTED IN MEDICAL RECORD: ICD-10-PCS | Mod: CPTII,,, | Performed by: FAMILY MEDICINE

## 2022-10-07 PROCEDURE — 3075F SYST BP GE 130 - 139MM HG: CPT | Mod: CPTII,,, | Performed by: FAMILY MEDICINE

## 2022-10-07 PROCEDURE — 3079F PR MOST RECENT DIASTOLIC BLOOD PRESSURE 80-89 MM HG: ICD-10-PCS | Mod: CPTII,,, | Performed by: FAMILY MEDICINE

## 2022-10-07 PROCEDURE — 3075F PR MOST RECENT SYSTOLIC BLOOD PRESS GE 130-139MM HG: ICD-10-PCS | Mod: CPTII,,, | Performed by: FAMILY MEDICINE

## 2022-10-07 RX ORDER — DESOXIMETASONE 2.5 MG/G
CREAM TOPICAL
COMMUNITY
Start: 2022-09-14 | End: 2023-07-19

## 2022-10-07 RX ORDER — DEXTROAMPHETAMINE SACCHARATE, AMPHETAMINE ASPARTATE, DEXTROAMPHETAMINE SULFATE AND AMPHETAMINE SULFATE 5; 5; 5; 5 MG/1; MG/1; MG/1; MG/1
1 TABLET ORAL EVERY MORNING
Qty: 30 TABLET | Refills: 0 | Status: SHIPPED | OUTPATIENT
Start: 2022-12-06 | End: 2023-01-03 | Stop reason: SDUPTHER

## 2022-10-07 RX ORDER — DEXTROAMPHETAMINE SACCHARATE, AMPHETAMINE ASPARTATE, DEXTROAMPHETAMINE SULFATE AND AMPHETAMINE SULFATE 5; 5; 5; 5 MG/1; MG/1; MG/1; MG/1
1 TABLET ORAL EVERY MORNING
Qty: 30 TABLET | Refills: 0 | Status: SHIPPED | OUTPATIENT
Start: 2022-11-06 | End: 2022-12-06

## 2022-10-07 RX ORDER — DEXTROAMPHETAMINE SACCHARATE, AMPHETAMINE ASPARTATE, DEXTROAMPHETAMINE SULFATE AND AMPHETAMINE SULFATE 5; 5; 5; 5 MG/1; MG/1; MG/1; MG/1
1 TABLET ORAL EVERY MORNING
Qty: 30 TABLET | Refills: 0 | Status: SHIPPED | OUTPATIENT
Start: 2022-10-07 | End: 2022-11-06

## 2022-10-07 NOTE — ASSESSMENT & PLAN NOTE
Fasting labs ordered  psa ordered  Due for repeat colonoscopy with dr. yuen 11/2022. States is scheduled.  Declines flu shot

## 2022-10-07 NOTE — PROGRESS NOTES
Subjective:        Patient ID: Ankur Alexander is a 57 y.o. male.    Chief Complaint: Annual Exam (wellness)      Patient presents to clinic unaccompanied for his wellness visit.  He is due for labs.  Declines immunizations     He was evaluated by Danny Garvin January 2022 and diagnosis of ADHD was confirmed.  He is currently on Adderall 20 mg once daily.  Last fill 8/18/22.  There was issue with pharmacy so he did not fill for September or October yet.  He does not take his medication on the weekends usually.  He feels as though the medication is helping him focus and would like to continue on his current dose.  He has 2 sons who have ADHD and are on medication.    He has right foot pain.  He states he has seen someone in the past for this and is on Mobic 15 mg as needed.       He has a history of erectile dysfunction and is currently on Viagra 50 mg daily as needed.  Levitra was too expensive     He has a history of hyperlipidemia and is on atorvastatin.  His cardiologist is Dr. Esteban     He had a colonoscopy November 2012 with dr. Damon with repeat recommended in 10 years             Review of Systems   Constitutional: Negative.    HENT: Negative.     Eyes: Negative.    Respiratory: Negative.     Cardiovascular: Negative.    Gastrointestinal: Negative.    Endocrine: Negative.    Genitourinary: Negative.    Musculoskeletal: Negative.    Skin: Negative.    Allergic/Immunologic: Negative.    Neurological: Negative.    Hematological: Negative.    Psychiatric/Behavioral: Negative.     All other systems reviewed and are negative.      Review of patient's allergies indicates:  No Known Allergies   Vitals:    10/07/22 0913   BP: 130/84   BP Location: Left arm   Pulse: 76   Resp: 16   Temp: 98.2 °F (36.8 °C)   SpO2: 97%   Weight: 81.4 kg (179 lb 8 oz)      Social History     Socioeconomic History    Marital status:    Tobacco Use    Smoking status: Never    Smokeless tobacco: Never   Substance and Sexual  Activity    Alcohol use: Yes     Comment: socially    Drug use: Never    Sexual activity: Yes      History reviewed. No pertinent family history.       Objective:     Physical Exam  Vitals and nursing note reviewed.   Constitutional:       Appearance: Normal appearance. He is normal weight.   HENT:      Head: Normocephalic.      Nose: Nose normal.      Mouth/Throat:      Mouth: Mucous membranes are moist.      Pharynx: Oropharynx is clear.   Eyes:      Extraocular Movements: Extraocular movements intact.   Cardiovascular:      Rate and Rhythm: Normal rate and regular rhythm.   Pulmonary:      Effort: Pulmonary effort is normal.      Breath sounds: Normal breath sounds.   Musculoskeletal:         General: Normal range of motion.   Skin:     General: Skin is warm and dry.   Neurological:      General: No focal deficit present.      Mental Status: He is alert and oriented to person, place, and time. Mental status is at baseline.   Psychiatric:         Mood and Affect: Mood normal.     Current Outpatient Medications on File Prior to Visit   Medication Sig Dispense Refill    atorvastatin (LIPITOR) 20 MG tablet Take 20 mg by mouth once daily.      augmented betamethasone dipropionate (DIPROLENE-AF) 0.05 % cream Apply topically 2 (two) times daily.      desoximetasone (TOPICORT) 0.25 % cream Apply topically.      meloxicam (MOBIC) 15 MG tablet Take 1 tablet (15 mg total) by mouth once daily. With food as needed for pain 30 tablet 5    metronidazole 0.75% (METROCREAM) 0.75 % Crea Apply topically once daily.      sildenafiL (VIAGRA) 50 MG tablet Take 1 tablet (50 mg total) by mouth daily as needed for Erectile Dysfunction. 5 tablet 11    [DISCONTINUED] dextroamphetamine-amphetamine (ADDERALL) 20 mg tablet Take 1 tablet by mouth every morning. 30 tablet 0    [DISCONTINUED] dextroamphetamine-amphetamine (ADDERALL) 20 mg tablet Take 1 tablet by mouth every morning. 30 tablet 0    [DISCONTINUED] dextroamphetamine-amphetamine  (ADDERALL) 20 mg tablet Take 1 tablet by mouth every morning. 30 tablet 0     No current facility-administered medications on file prior to visit.     Health Maintenance   Topic Date Due    Lipid Panel  12/15/2026    TETANUS VACCINE  04/03/2029    Hepatitis C Screening  Completed      Results for orders placed or performed in visit on 09/22/22   POCT Influenza A/B Molecular   Result Value Ref Range    Inflenza A Ag Positive     Influenza B Ag Negative           Assessment & Plan:     Active Problem List with Overview Notes    Diagnosis Date Noted    Colon cancer screening 09/13/2022    Wellness examination 06/13/2022    Attention deficit hyperactivity disorder (ADHD) 06/13/2022    Erectile dysfunction 06/13/2022    Hyperlipidemia 06/13/2022    Right foot pain 06/13/2022       1. Wellness examination  Assessment & Plan:  Fasting labs ordered  psa ordered  Due for repeat colonoscopy with dr. yuen 11/2022. States is scheduled.  Declines flu shot    Orders:  -     CBC Auto Differential; Future; Expected date: 10/07/2022  -     Comprehensive Metabolic Panel; Future; Expected date: 10/07/2022  -     Lipid Panel; Future; Expected date: 10/07/2022  -     TSH; Future; Expected date: 10/07/2022  -     Hemoglobin A1C; Future; Expected date: 10/07/2022  -     Urinalysis; Future; Expected date: 10/07/2022  -     PSA, Screening; Future; Expected date: 10/07/2022  -     HIV 1/2 Ag/Ab (4th Gen); Future; Expected date: 10/07/2022    2. Attention deficit hyperactivity disorder (ADHD), unspecified ADHD type  Assessment & Plan:  [facility] reviewed. Controlled substance policy signed today.  Prescriptions for October, November and December printed and given to patient.  Reminded patient that printed prescription CANNOT be replaced if lost, misplaced or stolen. Patient verbalized understanding.  Reminded patient this is a controlled medication and must be seen q3 months while on. Follow up visits can be virtual if needed.   Patient  is agreeable to plan and verbalized understanding      Orders:  -     CBC Auto Differential; Future; Expected date: 10/07/2022  -     Comprehensive Metabolic Panel; Future; Expected date: 10/07/2022  -     Lipid Panel; Future; Expected date: 10/07/2022  -     TSH; Future; Expected date: 10/07/2022  -     Hemoglobin A1C; Future; Expected date: 10/07/2022  -     Urinalysis; Future; Expected date: 10/07/2022  -     PSA, Screening; Future; Expected date: 10/07/2022  -     HIV 1/2 Ag/Ab (4th Gen); Future; Expected date: 10/07/2022    3. Colon cancer screening  Assessment & Plan:  Scheduled for repeat with dr. yuen 11/2022    Orders:  -     CBC Auto Differential; Future; Expected date: 10/07/2022  -     Comprehensive Metabolic Panel; Future; Expected date: 10/07/2022  -     Lipid Panel; Future; Expected date: 10/07/2022  -     TSH; Future; Expected date: 10/07/2022  -     Hemoglobin A1C; Future; Expected date: 10/07/2022  -     Urinalysis; Future; Expected date: 10/07/2022  -     PSA, Screening; Future; Expected date: 10/07/2022  -     HIV 1/2 Ag/Ab (4th Gen); Future; Expected date: 10/07/2022  -     Cancel: Ambulatory referral/consult to Gastroenterology; Future; Expected date: 10/14/2022    4. Erectile dysfunction, unspecified erectile dysfunction type  Assessment & Plan:  On viagra      Orders:  -     CBC Auto Differential; Future; Expected date: 10/07/2022  -     Comprehensive Metabolic Panel; Future; Expected date: 10/07/2022  -     Lipid Panel; Future; Expected date: 10/07/2022  -     TSH; Future; Expected date: 10/07/2022  -     Hemoglobin A1C; Future; Expected date: 10/07/2022  -     Urinalysis; Future; Expected date: 10/07/2022  -     PSA, Screening; Future; Expected date: 10/07/2022  -     HIV 1/2 Ag/Ab (4th Gen); Future; Expected date: 10/07/2022    5. Hyperlipidemia, unspecified hyperlipidemia type  Assessment & Plan:  On statin. Keep appts with cards    Orders:  -     CBC Auto Differential; Future;  Expected date: 10/07/2022  -     Comprehensive Metabolic Panel; Future; Expected date: 10/07/2022  -     Lipid Panel; Future; Expected date: 10/07/2022  -     TSH; Future; Expected date: 10/07/2022  -     Hemoglobin A1C; Future; Expected date: 10/07/2022  -     Urinalysis; Future; Expected date: 10/07/2022  -     PSA, Screening; Future; Expected date: 10/07/2022  -     HIV 1/2 Ag/Ab (4th Gen); Future; Expected date: 10/07/2022    6. Right foot pain  Assessment & Plan:  On mobic prn    Orders:  -     CBC Auto Differential; Future; Expected date: 10/07/2022  -     Comprehensive Metabolic Panel; Future; Expected date: 10/07/2022  -     Lipid Panel; Future; Expected date: 10/07/2022  -     TSH; Future; Expected date: 10/07/2022  -     Hemoglobin A1C; Future; Expected date: 10/07/2022  -     Urinalysis; Future; Expected date: 10/07/2022  -     PSA, Screening; Future; Expected date: 10/07/2022  -     HIV 1/2 Ag/Ab (4th Gen); Future; Expected date: 10/07/2022    7. Prostate cancer screening  -     CBC Auto Differential; Future; Expected date: 10/07/2022  -     Comprehensive Metabolic Panel; Future; Expected date: 10/07/2022  -     Lipid Panel; Future; Expected date: 10/07/2022  -     TSH; Future; Expected date: 10/07/2022  -     Hemoglobin A1C; Future; Expected date: 10/07/2022  -     Urinalysis; Future; Expected date: 10/07/2022  -     PSA, Screening; Future; Expected date: 10/07/2022  -     HIV 1/2 Ag/Ab (4th Gen); Future; Expected date: 10/07/2022    Other orders  -     dextroamphetamine-amphetamine (ADDERALL) 20 mg tablet; Take 1 tablet by mouth every morning.  Dispense: 30 tablet; Refill: 0  -     dextroamphetamine-amphetamine (ADDERALL) 20 mg tablet; Take 1 tablet by mouth every morning.  Dispense: 30 tablet; Refill: 0  -     dextroamphetamine-amphetamine (ADDERALL) 20 mg tablet; Take 1 tablet by mouth every morning.  Dispense: 30 tablet; Refill: 0       Follow up in about 3 months (around 1/7/2023) for q3 months for  ADHD virtual; 1 year for wellness with labs.

## 2022-10-07 NOTE — ASSESSMENT & PLAN NOTE
[facility] reviewed. Controlled substance policy signed today.  Prescriptions for October, November and December printed and given to patient.  Reminded patient that printed prescription CANNOT be replaced if lost, misplaced or stolen. Patient verbalized understanding.  Reminded patient this is a controlled medication and must be seen q3 months while on. Follow up visits can be virtual if needed.   Patient is agreeable to plan and verbalized understanding

## 2022-10-12 ENCOUNTER — LAB VISIT (OUTPATIENT)
Dept: LAB | Facility: HOSPITAL | Age: 57
End: 2022-10-12
Attending: FAMILY MEDICINE
Payer: COMMERCIAL

## 2022-10-12 DIAGNOSIS — Z12.5 PROSTATE CANCER SCREENING: ICD-10-CM

## 2022-10-12 DIAGNOSIS — F90.9 ATTENTION DEFICIT HYPERACTIVITY DISORDER (ADHD), UNSPECIFIED ADHD TYPE: ICD-10-CM

## 2022-10-12 DIAGNOSIS — Z00.00 WELLNESS EXAMINATION: ICD-10-CM

## 2022-10-12 DIAGNOSIS — M79.671 RIGHT FOOT PAIN: ICD-10-CM

## 2022-10-12 DIAGNOSIS — N52.9 ERECTILE DYSFUNCTION, UNSPECIFIED ERECTILE DYSFUNCTION TYPE: ICD-10-CM

## 2022-10-12 DIAGNOSIS — E78.5 HYPERLIPIDEMIA, UNSPECIFIED HYPERLIPIDEMIA TYPE: ICD-10-CM

## 2022-10-12 DIAGNOSIS — Z12.11 COLON CANCER SCREENING: ICD-10-CM

## 2022-10-12 LAB
ALBUMIN SERPL-MCNC: 4.1 GM/DL (ref 3.5–5)
ALBUMIN/GLOB SERPL: 1.4 RATIO (ref 1.1–2)
ALP SERPL-CCNC: 134 UNIT/L (ref 40–150)
ALT SERPL-CCNC: 39 UNIT/L (ref 0–55)
APPEARANCE UR: CLEAR
AST SERPL-CCNC: 26 UNIT/L (ref 5–34)
BASOPHILS # BLD AUTO: 0.03 X10(3)/MCL (ref 0–0.2)
BASOPHILS NFR BLD AUTO: 0.7 %
BILIRUB UR QL STRIP.AUTO: NEGATIVE MG/DL
BILIRUBIN DIRECT+TOT PNL SERPL-MCNC: 0.9 MG/DL
BUN SERPL-MCNC: 13.4 MG/DL (ref 8.4–25.7)
CALCIUM SERPL-MCNC: 9.4 MG/DL (ref 8.4–10.2)
CHLORIDE SERPL-SCNC: 109 MMOL/L (ref 98–107)
CHOLEST SERPL-MCNC: 208 MG/DL
CHOLEST/HDLC SERPL: 3 {RATIO} (ref 0–5)
CO2 SERPL-SCNC: 26 MMOL/L (ref 22–29)
COLOR UR AUTO: YELLOW
CREAT SERPL-MCNC: 0.91 MG/DL (ref 0.73–1.18)
EOSINOPHIL # BLD AUTO: 0.11 X10(3)/MCL (ref 0–0.9)
EOSINOPHIL NFR BLD AUTO: 2.5 %
ERYTHROCYTE [DISTWIDTH] IN BLOOD BY AUTOMATED COUNT: 13.6 % (ref 11.5–17)
EST. AVERAGE GLUCOSE BLD GHB EST-MCNC: 108.3 MG/DL
GFR SERPLBLD CREATININE-BSD FMLA CKD-EPI: >60 MLS/MIN/1.73/M2
GLOBULIN SER-MCNC: 2.9 GM/DL (ref 2.4–3.5)
GLUCOSE SERPL-MCNC: 109 MG/DL (ref 74–100)
GLUCOSE UR QL STRIP.AUTO: NEGATIVE MG/DL
HBA1C MFR BLD: 5.4 %
HCT VFR BLD AUTO: 48.3 % (ref 42–52)
HDLC SERPL-MCNC: 60 MG/DL (ref 35–60)
HGB BLD-MCNC: 16 GM/DL (ref 14–18)
HIV 1+2 AB+HIV1 P24 AG SERPL QL IA: NONREACTIVE
IMM GRANULOCYTES # BLD AUTO: 0.02 X10(3)/MCL (ref 0–0.04)
IMM GRANULOCYTES NFR BLD AUTO: 0.4 %
KETONES UR QL STRIP.AUTO: NEGATIVE MG/DL
LDLC SERPL CALC-MCNC: 122 MG/DL (ref 50–140)
LEUKOCYTE ESTERASE UR QL STRIP.AUTO: NEGATIVE UNIT/L
LYMPHOCYTES # BLD AUTO: 1.63 X10(3)/MCL (ref 0.6–4.6)
LYMPHOCYTES NFR BLD AUTO: 36.5 %
MCH RBC QN AUTO: 30.4 PG (ref 27–31)
MCHC RBC AUTO-ENTMCNC: 33.1 MG/DL (ref 33–36)
MCV RBC AUTO: 91.7 FL (ref 80–94)
MONOCYTES # BLD AUTO: 0.39 X10(3)/MCL (ref 0.1–1.3)
MONOCYTES NFR BLD AUTO: 8.7 %
NEUTROPHILS # BLD AUTO: 2.3 X10(3)/MCL (ref 2.1–9.2)
NEUTROPHILS NFR BLD AUTO: 51.2 %
NITRITE UR QL STRIP.AUTO: NEGATIVE
NRBC BLD AUTO-RTO: 0 %
PH UR STRIP.AUTO: 5 [PH]
PLATELET # BLD AUTO: 186 X10(3)/MCL (ref 130–400)
PMV BLD AUTO: 10.1 FL (ref 7.4–10.4)
POTASSIUM SERPL-SCNC: 4.2 MMOL/L (ref 3.5–5.1)
PROT SERPL-MCNC: 7 GM/DL (ref 6.4–8.3)
PROT UR QL STRIP.AUTO: NEGATIVE MG/DL
PSA SERPL-MCNC: 1.63 NG/ML
RBC # BLD AUTO: 5.27 X10(6)/MCL (ref 4.7–6.1)
RBC UR QL AUTO: NEGATIVE UNIT/L
SODIUM SERPL-SCNC: 142 MMOL/L (ref 136–145)
SP GR UR STRIP.AUTO: >=1.03
TRIGL SERPL-MCNC: 130 MG/DL (ref 34–140)
TSH SERPL-ACNC: 0.95 UIU/ML (ref 0.35–4.94)
UROBILINOGEN UR STRIP-ACNC: 0.2 MG/DL
VLDLC SERPL CALC-MCNC: 26 MG/DL
WBC # SPEC AUTO: 4.5 X10(3)/MCL (ref 4.5–11.5)

## 2022-10-12 PROCEDURE — 87389 HIV-1 AG W/HIV-1&-2 AB AG IA: CPT

## 2022-10-12 PROCEDURE — 84153 ASSAY OF PSA TOTAL: CPT

## 2022-10-12 PROCEDURE — 36415 COLL VENOUS BLD VENIPUNCTURE: CPT

## 2022-10-12 PROCEDURE — 84443 ASSAY THYROID STIM HORMONE: CPT

## 2022-10-12 PROCEDURE — 85025 COMPLETE CBC W/AUTO DIFF WBC: CPT

## 2022-10-12 PROCEDURE — 83036 HEMOGLOBIN GLYCOSYLATED A1C: CPT

## 2022-10-12 PROCEDURE — 80053 COMPREHEN METABOLIC PANEL: CPT

## 2022-10-12 PROCEDURE — 80061 LIPID PANEL: CPT

## 2022-10-13 NOTE — PROGRESS NOTES
Please call patient to inform that urine is within normal limits. Continue current medications. Keep scheduled follow up appointments.

## 2022-10-13 NOTE — PROGRESS NOTES
Please call patient to inform that labs are within normal limits except:  1. TC is up from previous and not at goal. Work on diet/exercise       Continue current medications. Keep scheduled follow up appointments.

## 2022-11-22 ENCOUNTER — LAB REQUISITION (OUTPATIENT)
Dept: LAB | Facility: HOSPITAL | Age: 57
End: 2022-11-22
Payer: COMMERCIAL

## 2022-11-22 DIAGNOSIS — L01.01 NON-BULLOUS IMPETIGO: ICD-10-CM

## 2022-11-22 PROCEDURE — 87070 CULTURE OTHR SPECIMN AEROBIC: CPT | Performed by: DERMATOLOGY

## 2022-11-25 LAB — BACTERIA SPEC CULT: NORMAL

## 2022-12-19 PROBLEM — Z00.00 WELLNESS EXAMINATION: Status: RESOLVED | Noted: 2022-06-13 | Resolved: 2022-12-19

## 2022-12-20 ENCOUNTER — OFFICE VISIT (OUTPATIENT)
Dept: URGENT CARE | Facility: CLINIC | Age: 57
End: 2022-12-20
Payer: COMMERCIAL

## 2022-12-20 VITALS
SYSTOLIC BLOOD PRESSURE: 111 MMHG | WEIGHT: 180 LBS | BODY MASS INDEX: 28.25 KG/M2 | HEIGHT: 67 IN | DIASTOLIC BLOOD PRESSURE: 73 MMHG | RESPIRATION RATE: 18 BRPM | TEMPERATURE: 98 F | HEART RATE: 107 BPM | OXYGEN SATURATION: 97 %

## 2022-12-20 DIAGNOSIS — R05.9 COUGH, UNSPECIFIED TYPE: ICD-10-CM

## 2022-12-20 DIAGNOSIS — J06.9 ACUTE URI: Primary | ICD-10-CM

## 2022-12-20 LAB
CTP QC/QA: YES
POC MOLECULAR INFLUENZA A AGN: NEGATIVE
POC MOLECULAR INFLUENZA B AGN: NEGATIVE

## 2022-12-20 PROCEDURE — 96372 THER/PROPH/DIAG INJ SC/IM: CPT | Mod: ,,, | Performed by: FAMILY MEDICINE

## 2022-12-20 PROCEDURE — 96372 PR INJECTION,THERAP/PROPH/DIAG2ST, IM OR SUBCUT: ICD-10-PCS | Mod: ,,, | Performed by: FAMILY MEDICINE

## 2022-12-20 PROCEDURE — 87502 INFLUENZA DNA AMP PROBE: CPT | Mod: QW,,, | Performed by: FAMILY MEDICINE

## 2022-12-20 PROCEDURE — 99213 PR OFFICE/OUTPT VISIT, EST, LEVL III, 20-29 MIN: ICD-10-PCS | Mod: 25,,, | Performed by: FAMILY MEDICINE

## 2022-12-20 PROCEDURE — 3074F SYST BP LT 130 MM HG: CPT | Mod: CPTII,,, | Performed by: FAMILY MEDICINE

## 2022-12-20 PROCEDURE — 3008F BODY MASS INDEX DOCD: CPT | Mod: CPTII,,, | Performed by: FAMILY MEDICINE

## 2022-12-20 PROCEDURE — 3078F PR MOST RECENT DIASTOLIC BLOOD PRESSURE < 80 MM HG: ICD-10-PCS | Mod: CPTII,,, | Performed by: FAMILY MEDICINE

## 2022-12-20 PROCEDURE — 1159F PR MEDICATION LIST DOCUMENTED IN MEDICAL RECORD: ICD-10-PCS | Mod: CPTII,,, | Performed by: FAMILY MEDICINE

## 2022-12-20 PROCEDURE — 1160F RVW MEDS BY RX/DR IN RCRD: CPT | Mod: CPTII,,, | Performed by: FAMILY MEDICINE

## 2022-12-20 PROCEDURE — 1159F MED LIST DOCD IN RCRD: CPT | Mod: CPTII,,, | Performed by: FAMILY MEDICINE

## 2022-12-20 PROCEDURE — 3074F PR MOST RECENT SYSTOLIC BLOOD PRESSURE < 130 MM HG: ICD-10-PCS | Mod: CPTII,,, | Performed by: FAMILY MEDICINE

## 2022-12-20 PROCEDURE — 3078F DIAST BP <80 MM HG: CPT | Mod: CPTII,,, | Performed by: FAMILY MEDICINE

## 2022-12-20 PROCEDURE — 87502 POCT INFLUENZA A/B MOLECULAR: ICD-10-PCS | Mod: QW,,, | Performed by: FAMILY MEDICINE

## 2022-12-20 PROCEDURE — 99213 OFFICE O/P EST LOW 20 MIN: CPT | Mod: 25,,, | Performed by: FAMILY MEDICINE

## 2022-12-20 PROCEDURE — 3008F PR BODY MASS INDEX (BMI) DOCUMENTED: ICD-10-PCS | Mod: CPTII,,, | Performed by: FAMILY MEDICINE

## 2022-12-20 PROCEDURE — 1160F PR REVIEW ALL MEDS BY PRESCRIBER/CLIN PHARMACIST DOCUMENTED: ICD-10-PCS | Mod: CPTII,,, | Performed by: FAMILY MEDICINE

## 2022-12-20 RX ORDER — BETAMETHASONE SODIUM PHOSPHATE AND BETAMETHASONE ACETATE 3; 3 MG/ML; MG/ML
9 INJECTION, SUSPENSION INTRA-ARTICULAR; INTRALESIONAL; INTRAMUSCULAR; SOFT TISSUE ONCE
Status: COMPLETED | OUTPATIENT
Start: 2022-12-20 | End: 2022-12-20

## 2022-12-20 RX ADMIN — BETAMETHASONE SODIUM PHOSPHATE AND BETAMETHASONE ACETATE 9 MG: 3; 3 INJECTION, SUSPENSION INTRA-ARTICULAR; INTRALESIONAL; INTRAMUSCULAR; SOFT TISSUE at 12:12

## 2022-12-20 NOTE — PATIENT INSTRUCTIONS
Discussed the physical findings, concerns of viral etiology.  Monitor the symptoms closely.  Symptomatic treatment for now.  Adequate hydration and rest.  [facility] today risk and benefits discussed voiced understanding.  Call or return to clinic for any questions.  Flu test negative, home COVID-19 test negative

## 2022-12-20 NOTE — PROGRESS NOTES
"Subjective:       Patient ID: Ankur Alexander is a 57 y.o. male.    Vitals:  height is 5' 7" (1.702 m) and weight is 81.6 kg (180 lb). His temperature is 98.2 °F (36.8 °C). His blood pressure is 111/73 and his pulse is 107. His respiration is 18 and oxygen saturation is 97%.     Chief Complaint: Sinus Problem (X today- body aches, chills, fatigue, cough, nausea- took at home covid test: negative - prescribed amox yesterday but hasnt started it - for facial rash )    HPI:  57-year-old present to clinic with concerns of nasal congestion, sinus congestion, body aches chills feeling fatigued coughing since yesterday.  Home COVID-19 test negative.  No measured fever at home.  Temp in the clinic 98.2.  No concerns of positive exposure to infection    ROS  :  Constitutional : _ No fever , Body aches, Chills  HENT_ Sore throat, postnasal drainage  Respiratory_no wheezing, no shortness of breath  Cardiovascular_no chest pain  Gastrointestinal_ No vomiting, No diarrhea, No abdominal pain  Musculoskeletal_no joint pain, no joint swelling  Integumentary_no skin rash     Objective:      Physical Exam    General : Alert and Oriented, No apparent distress, afebrile  Neck - supple  HENT : Oropharynx no redness or swelling. Tonsils not enlarged, no exudate. TMs intact mild fluid no redness.   Respiratory : Bilateral equal breath sounds, nonlabored respirations  Cardiovascular : Rate, rhythm regular, normal volume pulse, no murmur  Gastrointestinal: Full abdomen, soft, nontender to palpate  Integumentary : Warm, Dry and pain scattered erythematous rash appear distinct bilateral upper extremities lower extremities and face.  Currently follows up with dermatologist and states he is on antibiotics    Assessment:       1. Acute URI    2. Cough, unspecified type       Plan:     Discussed the physical findings, concerns of viral etiology.  Monitor the symptoms closely.  Symptomatic treatment for now.  Adequate hydration and rest.  " [facility] IM today risk and benefits discussed voiced understanding.  Call or return to clinic for any questions.  Flu test negative, home COVID-19 test negative    Acute URI  -     betamethasone acetate-betamethasone sodium phosphate injection 9 mg    Cough, unspecified type  -     POCT Influenza A/B MOLECULAR

## 2023-01-03 ENCOUNTER — OFFICE VISIT (OUTPATIENT)
Dept: FAMILY MEDICINE | Facility: CLINIC | Age: 58
End: 2023-01-03
Payer: COMMERCIAL

## 2023-01-03 DIAGNOSIS — N52.9 ERECTILE DYSFUNCTION, UNSPECIFIED ERECTILE DYSFUNCTION TYPE: ICD-10-CM

## 2023-01-03 DIAGNOSIS — Z12.11 COLON CANCER SCREENING: ICD-10-CM

## 2023-01-03 DIAGNOSIS — F90.9 ATTENTION DEFICIT HYPERACTIVITY DISORDER (ADHD), UNSPECIFIED ADHD TYPE: Primary | ICD-10-CM

## 2023-01-03 DIAGNOSIS — L71.9 ROSACEA: ICD-10-CM

## 2023-01-03 PROCEDURE — 99214 OFFICE O/P EST MOD 30 MIN: CPT | Mod: 95,,, | Performed by: FAMILY MEDICINE

## 2023-01-03 PROCEDURE — 1160F PR REVIEW ALL MEDS BY PRESCRIBER/CLIN PHARMACIST DOCUMENTED: ICD-10-PCS | Mod: CPTII,95,, | Performed by: FAMILY MEDICINE

## 2023-01-03 PROCEDURE — 1159F PR MEDICATION LIST DOCUMENTED IN MEDICAL RECORD: ICD-10-PCS | Mod: CPTII,95,, | Performed by: FAMILY MEDICINE

## 2023-01-03 PROCEDURE — 1160F RVW MEDS BY RX/DR IN RCRD: CPT | Mod: CPTII,95,, | Performed by: FAMILY MEDICINE

## 2023-01-03 PROCEDURE — 99214 PR OFFICE/OUTPT VISIT, EST, LEVL IV, 30-39 MIN: ICD-10-PCS | Mod: 95,,, | Performed by: FAMILY MEDICINE

## 2023-01-03 PROCEDURE — 1159F MED LIST DOCD IN RCRD: CPT | Mod: CPTII,95,, | Performed by: FAMILY MEDICINE

## 2023-01-03 RX ORDER — DEXTROAMPHETAMINE SACCHARATE, AMPHETAMINE ASPARTATE, DEXTROAMPHETAMINE SULFATE AND AMPHETAMINE SULFATE 5; 5; 5; 5 MG/1; MG/1; MG/1; MG/1
1 TABLET ORAL EVERY MORNING
Qty: 30 TABLET | Refills: 0 | Status: SHIPPED | OUTPATIENT
Start: 2023-01-06 | End: 2023-01-31

## 2023-01-03 RX ORDER — PHENIRAMINE MALEATE, PHENYLEPHRINE HCL 17.5; 1 MG/1; MG/1
TABLET ORAL
COMMUNITY
Start: 2022-07-19

## 2023-01-03 RX ORDER — DEXTROAMPHETAMINE SACCHARATE, AMPHETAMINE ASPARTATE, DEXTROAMPHETAMINE SULFATE AND AMPHETAMINE SULFATE 5; 5; 5; 5 MG/1; MG/1; MG/1; MG/1
1 TABLET ORAL EVERY MORNING
Qty: 30 TABLET | Refills: 0 | Status: SHIPPED | OUTPATIENT
Start: 2023-02-05 | End: 2023-01-31

## 2023-01-03 RX ORDER — AMOXICILLIN 500 MG/1
500 CAPSULE ORAL 2 TIMES DAILY
COMMUNITY
Start: 2023-01-02 | End: 2023-01-03

## 2023-01-03 RX ORDER — SILDENAFIL 100 MG/1
100 TABLET, FILM COATED ORAL DAILY PRN
Qty: 5 TABLET | Refills: 11 | Status: SHIPPED | OUTPATIENT
Start: 2023-01-03 | End: 2023-07-10 | Stop reason: SDUPTHER

## 2023-01-03 RX ORDER — SILDENAFIL 50 MG/1
100 TABLET, FILM COATED ORAL DAILY PRN
Qty: 5 TABLET | Refills: 11 | Status: SHIPPED | OUTPATIENT
Start: 2023-01-03 | End: 2023-01-03 | Stop reason: SDUPTHER

## 2023-01-03 RX ORDER — SILDENAFIL 50 MG/1
50 TABLET, FILM COATED ORAL DAILY PRN
Qty: 5 TABLET | Refills: 11 | Status: SHIPPED | OUTPATIENT
Start: 2023-01-03 | End: 2023-01-03 | Stop reason: SDUPTHER

## 2023-01-03 RX ORDER — DEXTROAMPHETAMINE SACCHARATE, AMPHETAMINE ASPARTATE, DEXTROAMPHETAMINE SULFATE AND AMPHETAMINE SULFATE 5; 5; 5; 5 MG/1; MG/1; MG/1; MG/1
1 TABLET ORAL EVERY MORNING
Qty: 30 TABLET | Refills: 0 | Status: SHIPPED | OUTPATIENT
Start: 2023-03-07 | End: 2023-01-31

## 2023-01-03 RX ORDER — MUPIROCIN 20 MG/G
OINTMENT TOPICAL 3 TIMES DAILY
COMMUNITY
Start: 2022-11-22

## 2023-01-03 NOTE — ASSESSMENT & PLAN NOTE
[facility] reviewed. Controlled substance policy signed 10/2022.  Postdated prescription refills sent in electronically to pharmacy.  Reminded patient this is a controlled medication and must be seen q3 months while on. Follow up visits can be virtual if needed.   Patient is agreeable to plan and verbalized understanding

## 2023-01-03 NOTE — PROGRESS NOTES
Subjective:        Patient ID: Ankur Alexander is a 57 y.o. male.    Chief Complaint: Follow-up (ADHD follow up/Pt would like to see if he can increase his sildenafil dose  )      Patient presents via telemed for follow up.  He is due for his wellness visit in October.      He was evaluated by Danny Garvin January 2022 and diagnosis of ADHD was confirmed.  He is currently on Adderall 20 mg once daily.   He does not take his medication on the weekends usually.  He feels as though the medication is helping him focus and would like to continue on his current dose.  He has 2 sons who have ADHD and are on medication.    He has a history of erectile dysfunction and is currently on Viagra 50 mg daily as needed.  Levitra was too expensive. Asking to increase dose.     Has rosacea. Seeing derm.  Using topicals.  Does not find it is helping.        He has right foot pain.  He states he has seen someone in the past for this and is on Mobic 15 mg as needed.       He has a history of hyperlipidemia and is on atorvastatin.  His cardiologist is Dr. Esteban     He had a colonoscopy November 2022 with dr. Damon. With repeat due in 5 years.  Will request record       Review of Systems   Constitutional: Negative.  Negative for activity change and unexpected weight change.   HENT: Negative.  Negative for hearing loss, rhinorrhea and trouble swallowing.    Eyes: Negative.  Negative for discharge and visual disturbance.   Respiratory: Negative.  Negative for chest tightness and wheezing.    Cardiovascular: Negative.  Negative for chest pain and palpitations.   Gastrointestinal: Negative.  Negative for blood in stool, constipation, diarrhea and vomiting.   Endocrine: Negative.  Negative for polydipsia and polyuria.   Genitourinary: Negative.  Negative for difficulty urinating, hematuria and urgency.   Musculoskeletal: Negative.  Negative for arthralgias, joint swelling and neck pain.   Skin: Negative.    Allergic/Immunologic:  Negative.    Neurological: Negative.  Negative for weakness and headaches.   Hematological: Negative.    Psychiatric/Behavioral: Negative.  Negative for confusion and dysphoric mood.    All other systems reviewed and are negative.      Review of patient's allergies indicates:  No Known Allergies   There were no vitals filed for this visit.   Social History     Socioeconomic History    Marital status:    Tobacco Use    Smoking status: Never    Smokeless tobacco: Never   Substance and Sexual Activity    Alcohol use: Yes     Alcohol/week: 3.0 standard drinks     Types: 3 Drinks containing 0.5 oz of alcohol per week     Comment: Social occasions only    Drug use: Never    Sexual activity: Yes     Partners: Female      Family History   Problem Relation Age of Onset    No Known Problems Mother     No Known Problems Father     No Known Problems Sister     No Known Problems Brother     No Known Problems Other     Cancer Brother         Complete remission    Cancer Sister         Breast Cancer          Objective:     Physical Exam  Vitals and nursing note reviewed.   Constitutional:       Appearance: Normal appearance. He is normal weight.   HENT:      Head: Normocephalic and atraumatic.   Pulmonary:      Effort: Pulmonary effort is normal.   Neurological:      General: No focal deficit present.      Mental Status: He is alert and oriented to person, place, and time. Mental status is at baseline.   Psychiatric:         Mood and Affect: Mood normal.         Behavior: Behavior normal.         Thought Content: Thought content normal.     Current Outpatient Medications on File Prior to Visit   Medication Sig Dispense Refill    ALAHIST D 10-17.5 mg Tab TAKE 1 TABLET BY MOUTH EVERY 6 HOURS AS NEEDED NASAL CONGESTION, POSTNASAL      atorvastatin (LIPITOR) 20 MG tablet Take 20 mg by mouth once daily.      augmented betamethasone dipropionate (DIPROLENE-AF) 0.05 % cream Apply topically 2 (two) times daily.      desoximetasone  (TOPICORT) 0.25 % cream Apply topically.      meloxicam (MOBIC) 15 MG tablet Take 1 tablet (15 mg total) by mouth once daily. With food as needed for pain 30 tablet 5    metronidazole 0.75% (METROCREAM) 0.75 % Crea Apply topically once daily.      mupirocin (BACTROBAN) 2 % ointment Apply topically 3 (three) times daily.      [DISCONTINUED] amoxicillin (AMOXIL) 500 MG capsule Take 500 mg by mouth 2 (two) times daily.      [DISCONTINUED] dextroamphetamine-amphetamine (ADDERALL) 20 mg tablet Take 1 tablet by mouth every morning. 30 tablet 0    [DISCONTINUED] sildenafiL (VIAGRA) 50 MG tablet Take 1 tablet (50 mg total) by mouth daily as needed for Erectile Dysfunction. 5 tablet 11     No current facility-administered medications on file prior to visit.     Health Maintenance   Topic Date Due    Lipid Panel  10/12/2027    TETANUS VACCINE  04/03/2029    Hepatitis C Screening  Completed      Results for orders placed or performed in visit on 12/20/22   POCT Influenza A/B MOLECULAR   Result Value Ref Range    POC Molecular Influenza A Ag Negative Negative, Not Reported    POC Molecular Influenza B Ag Negative Negative, Not Reported     Acceptable Yes           Assessment & Plan:     Active Problem List with Overview Notes    Diagnosis Date Noted    Rosacea 01/03/2023    Colon cancer screening 09/13/2022    Attention deficit hyperactivity disorder (ADHD) 06/13/2022    Erectile dysfunction 06/13/2022    Hyperlipidemia 06/13/2022    Right foot pain 06/13/2022       1. Attention deficit hyperactivity disorder (ADHD), unspecified ADHD type  Assessment & Plan:  [employer] reviewed. Controlled substance policy signed 10/2022.  Postdated prescription refills sent in electronically to pharmacy.  Reminded patient this is a controlled medication and must be seen q3 months while on. Follow up visits can be virtual if needed.   Patient is agreeable to plan and verbalized understanding      Orders:  -      dextroamphetamine-amphetamine (ADDERALL) 20 mg tablet; Take 1 tablet by mouth every morning.  Dispense: 30 tablet; Refill: 0  -     dextroamphetamine-amphetamine (ADDERALL) 20 mg tablet; Take 1 tablet by mouth every morning.  Dispense: 30 tablet; Refill: 0  -     dextroamphetamine-amphetamine (ADDERALL) 20 mg tablet; Take 1 tablet by mouth every morning.  Dispense: 30 tablet; Refill: 0    2. Colon cancer screening  Assessment & Plan:  Request colonoscopy report with dr. yuen 11/2022      3. Erectile dysfunction, unspecified erectile dysfunction type  Assessment & Plan:  Will increase viagra dose. New rx sent to pharm on file.     Orders:  -     Discontinue: sildenafiL (VIAGRA) 50 MG tablet; Take 1 tablet (50 mg total) by mouth daily as needed for Erectile Dysfunction.  Dispense: 5 tablet; Refill: 11  -     Discontinue: sildenafiL (VIAGRA) 50 MG tablet; Take 2 tablets (100 mg total) by mouth daily as needed for Erectile Dysfunction.  Dispense: 5 tablet; Refill: 11  -     sildenafiL (VIAGRA) 100 MG tablet; Take 1 tablet (100 mg total) by mouth daily as needed for Erectile Dysfunction.  Dispense: 5 tablet; Refill: 11    4. Rosacea  Assessment & Plan:  On topicals. Keep appts with derm. Contact clinic for concerns.            Follow up in about 4 weeks (around 1/31/2023) for ADHD virtual visit.

## 2023-01-31 ENCOUNTER — OFFICE VISIT (OUTPATIENT)
Dept: FAMILY MEDICINE | Facility: CLINIC | Age: 58
End: 2023-01-31
Payer: COMMERCIAL

## 2023-01-31 DIAGNOSIS — F90.9 ATTENTION DEFICIT HYPERACTIVITY DISORDER (ADHD), UNSPECIFIED ADHD TYPE: Primary | ICD-10-CM

## 2023-01-31 PROCEDURE — 99214 OFFICE O/P EST MOD 30 MIN: CPT | Mod: 95,,, | Performed by: FAMILY MEDICINE

## 2023-01-31 PROCEDURE — 1160F PR REVIEW ALL MEDS BY PRESCRIBER/CLIN PHARMACIST DOCUMENTED: ICD-10-PCS | Mod: CPTII,95,, | Performed by: FAMILY MEDICINE

## 2023-01-31 PROCEDURE — 99214 PR OFFICE/OUTPT VISIT, EST, LEVL IV, 30-39 MIN: ICD-10-PCS | Mod: 95,,, | Performed by: FAMILY MEDICINE

## 2023-01-31 PROCEDURE — 1160F RVW MEDS BY RX/DR IN RCRD: CPT | Mod: CPTII,95,, | Performed by: FAMILY MEDICINE

## 2023-01-31 PROCEDURE — 1159F PR MEDICATION LIST DOCUMENTED IN MEDICAL RECORD: ICD-10-PCS | Mod: CPTII,95,, | Performed by: FAMILY MEDICINE

## 2023-01-31 PROCEDURE — 1159F MED LIST DOCD IN RCRD: CPT | Mod: CPTII,95,, | Performed by: FAMILY MEDICINE

## 2023-01-31 RX ORDER — DEXTROAMPHETAMINE SACCHARATE, AMPHETAMINE ASPARTATE, DEXTROAMPHETAMINE SULFATE AND AMPHETAMINE SULFATE 2.5; 2.5; 2.5; 2.5 MG/1; MG/1; MG/1; MG/1
20 TABLET ORAL DAILY
Qty: 60 TABLET | Refills: 0 | Status: SHIPPED | OUTPATIENT
Start: 2023-03-02 | End: 2023-04-01

## 2023-01-31 RX ORDER — DEXTROAMPHETAMINE SACCHARATE, AMPHETAMINE ASPARTATE, DEXTROAMPHETAMINE SULFATE AND AMPHETAMINE SULFATE 2.5; 2.5; 2.5; 2.5 MG/1; MG/1; MG/1; MG/1
20 TABLET ORAL DAILY
Qty: 60 TABLET | Refills: 0 | Status: SHIPPED | OUTPATIENT
Start: 2023-01-31 | End: 2023-03-02

## 2023-01-31 RX ORDER — DEXTROAMPHETAMINE SACCHARATE, AMPHETAMINE ASPARTATE, DEXTROAMPHETAMINE SULFATE AND AMPHETAMINE SULFATE 2.5; 2.5; 2.5; 2.5 MG/1; MG/1; MG/1; MG/1
20 TABLET ORAL DAILY
Qty: 60 TABLET | Refills: 0 | Status: SHIPPED | OUTPATIENT
Start: 2023-04-01 | End: 2023-05-01

## 2023-01-31 NOTE — ASSESSMENT & PLAN NOTE
Starr reviewed. Controlled substance policy signed 10/2022.  Postdated prescription refills sent in electronically to pharmacy.  Was having trouble with pharmacy having 20mg in stock so 10mg rx sent in.  Reminded patient this is a controlled medication and must be seen q3 months while on. Follow up visits can be virtual if needed.   Patient is agreeable to plan and verbalized understanding

## 2023-01-31 NOTE — PROGRESS NOTES
Subjective:        Patient ID: Ankur Alexander is a 57 y.o. male.    Chief Complaint: No chief complaint on file.      Patient presents via telemed for follow up.  He is due for his wellness visit in October.     C/o mild cough x 3 days. Not worsening.  No pnd. Declines meds today.      He was evaluated by Danny Garvin 1/2022 and diagnosis of ADHD was confirmed.  He is currently on Adderall 20 mg once daily.   He does not take his medication on the weekends usually.  He feels as though the medication is helping him focus and would like to continue on his current dose.  He has 2 sons who have ADHD and are on medication.  Has been having problems with pharmacy having meds in stock.  Son is on 10mg and found pharmacy that had it in stock. Asking to change to 10mg tabs so he can get prescription.      He has erectile dysfunction and is currently on Viagra as needed.  Levitra was too expensive.       Has rosacea. Seeing derm.  Using topicals.  Does not find it is helping.      He has right foot pain.  He states he has seen someone in the past for this and is on Mobic 15 mg as needed.       He has a history of hyperlipidemia and is on atorvastatin.  His cardiologist is Dr. Esteban     He had a colonoscopy November 2022 with dr. Damon. With repeat due in 5 years.          The patient location is: home  The chief complaint leading to consultation is: adhd and cough    Visit type: audiovisual    Face to Face time with patient: 12 minutes  15 minutes of total time spent on the encounter, which includes face to face time and non-face to face time preparing to see the patient (eg, review of tests), Obtaining and/or reviewing separately obtained history, Documenting clinical information in the electronic or other health record, Independently interpreting results (not separately reported) and communicating results to the patient/family/caregiver, or Care coordination (not separately reported).         Each patient to  whom he or she provides medical services by telemedicine is:  (1) informed of the relationship between the physician and patient and the respective role of any other health care provider with respect to management of the patient; and (2) notified that he or she may decline to receive medical services by telemedicine and may withdraw from such care at any time.    Notes:         Review of Systems   Constitutional:  Negative for activity change and unexpected weight change.   HENT:  Negative for hearing loss, rhinorrhea and trouble swallowing.    Eyes:  Negative for discharge and visual disturbance.   Respiratory:  Positive for cough. Negative for chest tightness and wheezing.    Cardiovascular:  Negative for chest pain and palpitations.   Gastrointestinal:  Negative for blood in stool, constipation, diarrhea and vomiting.   Endocrine: Negative for polydipsia and polyuria.   Genitourinary:  Negative for difficulty urinating, hematuria and urgency.   Musculoskeletal:  Negative for arthralgias, joint swelling and neck pain.   Neurological:  Negative for weakness and headaches.   Psychiatric/Behavioral:  Negative for confusion and dysphoric mood.        Review of patient's allergies indicates:  No Known Allergies   There were no vitals filed for this visit.   Social History     Socioeconomic History    Marital status:    Tobacco Use    Smoking status: Never    Smokeless tobacco: Never   Substance and Sexual Activity    Alcohol use: Yes     Alcohol/week: 3.0 standard drinks     Types: 3 Drinks containing 0.5 oz of alcohol per week     Comment: Social occasions only    Drug use: Never    Sexual activity: Yes     Partners: Female      Family History   Problem Relation Age of Onset    No Known Problems Mother     No Known Problems Father     No Known Problems Sister     No Known Problems Brother     No Known Problems Other     Cancer Brother         Complete remission    Cancer Sister         Breast Cancer           Objective:     Physical Exam  Vitals and nursing note reviewed.   Constitutional:       Appearance: Normal appearance. He is normal weight.   HENT:      Head: Normocephalic and atraumatic.   Pulmonary:      Breath sounds: Normal breath sounds.   Neurological:      General: No focal deficit present.      Mental Status: He is alert and oriented to person, place, and time. Mental status is at baseline.   Psychiatric:         Mood and Affect: Mood normal.         Behavior: Behavior normal.         Thought Content: Thought content normal.     Current Outpatient Medications on File Prior to Visit   Medication Sig Dispense Refill    ALAHIST D 10-17.5 mg Tab TAKE 1 TABLET BY MOUTH EVERY 6 HOURS AS NEEDED NASAL CONGESTION, POSTNASAL      atorvastatin (LIPITOR) 20 MG tablet Take 20 mg by mouth once daily.      augmented betamethasone dipropionate (DIPROLENE-AF) 0.05 % cream Apply topically 2 (two) times daily.      desoximetasone (TOPICORT) 0.25 % cream Apply topically.      meloxicam (MOBIC) 15 MG tablet Take 1 tablet (15 mg total) by mouth once daily. With food as needed for pain 30 tablet 5    metronidazole 0.75% (METROCREAM) 0.75 % Crea Apply topically once daily.      mupirocin (BACTROBAN) 2 % ointment Apply topically 3 (three) times daily.      sildenafiL (VIAGRA) 100 MG tablet Take 1 tablet (100 mg total) by mouth daily as needed for Erectile Dysfunction. 5 tablet 11    [DISCONTINUED] dextroamphetamine-amphetamine (ADDERALL) 20 mg tablet Take 1 tablet by mouth every morning. 30 tablet 0    [DISCONTINUED] dextroamphetamine-amphetamine (ADDERALL) 20 mg tablet Take 1 tablet by mouth every morning. 30 tablet 0    [DISCONTINUED] dextroamphetamine-amphetamine (ADDERALL) 20 mg tablet Take 1 tablet by mouth every morning. 30 tablet 0     No current facility-administered medications on file prior to visit.     Health Maintenance   Topic Date Due    Lipid Panel  10/12/2027    TETANUS VACCINE  04/03/2029    Hepatitis C  Screening  Completed      Results for orders placed or performed in visit on 12/20/22   POCT Influenza A/B MOLECULAR   Result Value Ref Range    POC Molecular Influenza A Ag Negative Negative, Not Reported    POC Molecular Influenza B Ag Negative Negative, Not Reported     Acceptable Yes           Assessment & Plan:     Active Problem List with Overview Notes    Diagnosis Date Noted    Rosacea 01/03/2023    Colon cancer screening 09/13/2022    Attention deficit hyperactivity disorder (ADHD) 06/13/2022    Erectile dysfunction 06/13/2022    Hyperlipidemia 06/13/2022    Right foot pain 06/13/2022       1. Attention deficit hyperactivity disorder (ADHD), unspecified ADHD type  Assessment & Plan:  [employer] reviewed. Controlled substance policy signed 10/2022.  Postdated prescription refills sent in electronically to pharmacy.  Was having trouble with pharmacy having 20mg in stock so 10mg rx sent in.  Reminded patient this is a controlled medication and must be seen q3 months while on. Follow up visits can be virtual if needed.   Patient is agreeable to plan and verbalized understanding      Orders:  -     dextroamphetamine-amphetamine (ADDERALL) 10 mg Tab; Take 2 tablets (20 mg total) by mouth once daily.  Dispense: 60 tablet; Refill: 0  -     dextroamphetamine-amphetamine (ADDERALL) 10 mg Tab; Take 2 tablets (20 mg total) by mouth once daily.  Dispense: 60 tablet; Refill: 0  -     dextroamphetamine-amphetamine (ADDERALL) 10 mg Tab; Take 2 tablets (20 mg total) by mouth once daily.  Dispense: 60 tablet; Refill: 0         Follow up in about 3 months (around 4/30/2023) for ADHD virtual visit.

## 2023-04-19 ENCOUNTER — OFFICE VISIT (OUTPATIENT)
Dept: FAMILY MEDICINE | Facility: CLINIC | Age: 58
End: 2023-04-19
Payer: COMMERCIAL

## 2023-04-19 DIAGNOSIS — F90.9 ATTENTION DEFICIT HYPERACTIVITY DISORDER (ADHD), UNSPECIFIED ADHD TYPE: Primary | ICD-10-CM

## 2023-04-19 DIAGNOSIS — E78.2 MIXED HYPERLIPIDEMIA: ICD-10-CM

## 2023-04-19 PROCEDURE — 1160F PR REVIEW ALL MEDS BY PRESCRIBER/CLIN PHARMACIST DOCUMENTED: ICD-10-PCS | Mod: CPTII,95,, | Performed by: FAMILY MEDICINE

## 2023-04-19 PROCEDURE — 1159F MED LIST DOCD IN RCRD: CPT | Mod: CPTII,95,, | Performed by: FAMILY MEDICINE

## 2023-04-19 PROCEDURE — 1160F RVW MEDS BY RX/DR IN RCRD: CPT | Mod: CPTII,95,, | Performed by: FAMILY MEDICINE

## 2023-04-19 PROCEDURE — 99214 PR OFFICE/OUTPT VISIT, EST, LEVL IV, 30-39 MIN: ICD-10-PCS | Mod: 95,,, | Performed by: FAMILY MEDICINE

## 2023-04-19 PROCEDURE — 1159F PR MEDICATION LIST DOCUMENTED IN MEDICAL RECORD: ICD-10-PCS | Mod: CPTII,95,, | Performed by: FAMILY MEDICINE

## 2023-04-19 PROCEDURE — 99214 OFFICE O/P EST MOD 30 MIN: CPT | Mod: 95,,, | Performed by: FAMILY MEDICINE

## 2023-04-19 RX ORDER — MOMETASONE FUROATE 1 MG/ML
SOLUTION TOPICAL 2 TIMES DAILY
COMMUNITY
Start: 2023-02-21 | End: 2023-07-19

## 2023-04-19 RX ORDER — HYDROCORTISONE 25 MG/G
CREAM TOPICAL
COMMUNITY
Start: 2023-02-21 | End: 2023-07-19 | Stop reason: SDUPTHER

## 2023-04-19 RX ORDER — DEXTROAMPHETAMINE SACCHARATE, AMPHETAMINE ASPARTATE, DEXTROAMPHETAMINE SULFATE AND AMPHETAMINE SULFATE 5; 5; 5; 5 MG/1; MG/1; MG/1; MG/1
1 TABLET ORAL DAILY
Qty: 30 TABLET | Refills: 0 | Status: SHIPPED | OUTPATIENT
Start: 2023-04-19 | End: 2023-05-19

## 2023-04-19 RX ORDER — CALCIPOTRIENE 50 UG/G
CREAM TOPICAL 2 TIMES DAILY
COMMUNITY
Start: 2023-04-01

## 2023-04-19 RX ORDER — ATORVASTATIN CALCIUM 20 MG/1
20 TABLET, FILM COATED ORAL DAILY
Qty: 90 TABLET | Refills: 3 | Status: SHIPPED | OUTPATIENT
Start: 2023-04-19 | End: 2024-04-18

## 2023-04-19 RX ORDER — DEXTROAMPHETAMINE SACCHARATE, AMPHETAMINE ASPARTATE, DEXTROAMPHETAMINE SULFATE AND AMPHETAMINE SULFATE 5; 5; 5; 5 MG/1; MG/1; MG/1; MG/1
1 TABLET ORAL DAILY
Qty: 30 TABLET | Refills: 0 | Status: SHIPPED | OUTPATIENT
Start: 2023-06-18 | End: 2023-07-19 | Stop reason: SDUPTHER

## 2023-04-19 RX ORDER — ATORVASTATIN CALCIUM 20 MG/1
20 TABLET, FILM COATED ORAL DAILY
Qty: 90 TABLET | Refills: 3 | Status: SHIPPED | OUTPATIENT
Start: 2023-04-19 | End: 2023-04-19

## 2023-04-19 RX ORDER — CLOBETASOL PROPIONATE 0.5 MG/G
OINTMENT TOPICAL
COMMUNITY
Start: 2023-04-01 | End: 2023-07-19

## 2023-04-19 RX ORDER — DEXTROAMPHETAMINE SACCHARATE, AMPHETAMINE ASPARTATE, DEXTROAMPHETAMINE SULFATE AND AMPHETAMINE SULFATE 5; 5; 5; 5 MG/1; MG/1; MG/1; MG/1
1 TABLET ORAL DAILY
Qty: 30 TABLET | Refills: 0 | Status: SHIPPED | OUTPATIENT
Start: 2023-05-19 | End: 2023-06-18

## 2023-04-19 NOTE — ASSESSMENT & PLAN NOTE
[place] reviewed. Controlled substance policy signed 10/2022.  Postdated prescription refills sent in for April, may, and june electronically to pharmacy.  Reminded patient this is a controlled medication and must be seen q3 months while on. Follow up visits can be virtual if needed.   Patient is agreeable to plan and verbalized understanding

## 2023-04-19 NOTE — PROGRESS NOTES
Subjective:        Patient ID: Ankur Alexander is a 57 y.o. male.    Chief Complaint: Follow-up (ADHD follow up )      Patient presents via telemed for follow up.  He is due for his wellness visit in October.      He was evaluated by Danny Garvin 1/2022 and diagnosis of ADHD was confirmed.  He is currently on Adderall 20 mg once daily.   He does not take his medication on the weekends usually.  He feels as though the medication is helping him focus and would like to continue on his current dose.  He has 2 sons who have ADHD and are on medication.  Is due for refills.      He has erectile dysfunction and is currently on Viagra as needed.  Levitra was too expensive.       Has rosacea. Seeing derm.  Using topicals.  Does not find it is helping.      He has right foot pain.  He states he has seen someone in the past for this and is on Mobic 15 mg as needed.       He has hyperlipidemia and is on atorvastatin.  His cardiologist is Dr. Esteban. Asking for refill     He had a colonoscopy November 2022 with dr. Damon. With repeat due in 5 years.             The patient location is: home  The chief complaint leading to consultation is: adhd      Visit type: audiovisual     Face to Face time with patient: 15 minutes  20 minutes of total time spent on the encounter, which includes face to face time and non-face to face time preparing to see the patient (eg, review of tests), Obtaining and/or reviewing separately obtained history, Documenting clinical information in the electronic or other health record, Independently interpreting results (not separately reported) and communicating results to the patient/family/caregiver, or Care coordination (not separately reported).            Each patient to whom he or she provides medical services by telemedicine is:  (1) informed of the relationship between the physician and patient and the respective role of any other health care provider with respect to management of the  patient; and (2) notified that he or she may decline to receive medical services by telemedicine and may withdraw from such care at any time.    Review of Systems   Constitutional: Negative.    HENT: Negative.     Eyes: Negative.    Respiratory: Negative.     Cardiovascular: Negative.    Gastrointestinal: Negative.    Endocrine: Negative.    Genitourinary: Negative.    Musculoskeletal: Negative.    Skin: Negative.    Allergic/Immunologic: Negative.    Neurological: Negative.    Hematological: Negative.    Psychiatric/Behavioral: Negative.     All other systems reviewed and are negative.      Review of patient's allergies indicates:  No Known Allergies   There were no vitals filed for this visit.   Social History     Socioeconomic History    Marital status:    Tobacco Use    Smoking status: Never    Smokeless tobacco: Never   Substance and Sexual Activity    Alcohol use: Yes     Alcohol/week: 3.0 standard drinks     Types: 3 Drinks containing 0.5 oz of alcohol per week     Comment: Social occasions only    Drug use: Never    Sexual activity: Yes     Partners: Female      Family History   Problem Relation Age of Onset    No Known Problems Mother     No Known Problems Father     No Known Problems Sister     No Known Problems Brother     No Known Problems Other     Cancer Brother         Complete remission    Cancer Sister         Breast Cancer          Objective:     Physical Exam  Vitals and nursing note reviewed.   Constitutional:       Appearance: Normal appearance. He is normal weight.   HENT:      Head: Normocephalic.      Nose: Nose normal.      Mouth/Throat:      Mouth: Mucous membranes are moist.      Pharynx: Oropharynx is clear.   Eyes:      Extraocular Movements: Extraocular movements intact.   Pulmonary:      Effort: Pulmonary effort is normal.   Neurological:      General: No focal deficit present.      Mental Status: He is alert and oriented to person, place, and time. Mental status is at baseline.    Psychiatric:         Mood and Affect: Mood normal.     Current Outpatient Medications on File Prior to Visit   Medication Sig Dispense Refill    ALAHIST D 10-17.5 mg Tab TAKE 1 TABLET BY MOUTH EVERY 6 HOURS AS NEEDED NASAL CONGESTION, POSTNASAL      augmented betamethasone dipropionate (DIPROLENE-AF) 0.05 % cream Apply topically 2 (two) times daily.      calcipotriene (DOVONOX) 0.005 % cream Apply topically 2 (two) times daily.      clobetasol 0.05% (TEMOVATE) 0.05 % Oint SMARTSIG:sparingly Topical Twice Daily      desoximetasone (TOPICORT) 0.25 % cream Apply topically.      dextroamphetamine-amphetamine (ADDERALL) 10 mg Tab Take 2 tablets (20 mg total) by mouth once daily. 60 tablet 0    hydrocortisone 2.5 % cream Apply topically.      meloxicam (MOBIC) 15 MG tablet Take 1 tablet (15 mg total) by mouth once daily. With food as needed for pain 30 tablet 5    metronidazole 0.75% (METROCREAM) 0.75 % Crea Apply topically once daily.      mometasone (ELOCON) 0.1 % solution Apply topically 2 (two) times daily.      mupirocin (BACTROBAN) 2 % ointment Apply topically 3 (three) times daily.      sildenafiL (VIAGRA) 100 MG tablet Take 1 tablet (100 mg total) by mouth daily as needed for Erectile Dysfunction. 5 tablet 11    [DISCONTINUED] atorvastatin (LIPITOR) 20 MG tablet Take 20 mg by mouth once daily.       No current facility-administered medications on file prior to visit.     Health Maintenance   Topic Date Due    Lipid Panel  01/12/2028    TETANUS VACCINE  04/03/2029    Hepatitis C Screening  Completed      Results for orders placed or performed in visit on 12/20/22   POCT Influenza A/B MOLECULAR   Result Value Ref Range    POC Molecular Influenza A Ag Negative Negative, Not Reported    POC Molecular Influenza B Ag Negative Negative, Not Reported     Acceptable Yes           Assessment & Plan:     Active Problem List with Overview Notes    Diagnosis Date Noted    Rosacea 01/03/2023    Colon cancer  screening 09/13/2022    Attention deficit hyperactivity disorder (ADHD) 06/13/2022    Erectile dysfunction 06/13/2022    Hyperlipidemia 06/13/2022    Right foot pain 06/13/2022       1. Attention deficit hyperactivity disorder (ADHD), unspecified ADHD type  Assessment & Plan:  [employer] reviewed. Controlled substance policy signed 10/2022.  Postdated prescription refills sent in for April, may, and june electronically to pharmacy.  Reminded patient this is a controlled medication and must be seen q3 months while on. Follow up visits can be virtual if needed.   Patient is agreeable to plan and verbalized understanding      Orders:  -     dextroamphetamine-amphetamine (ADDERALL) 20 mg tablet; Take 1 tablet by mouth once daily.  Dispense: 30 tablet; Refill: 0  -     dextroamphetamine-amphetamine (ADDERALL) 20 mg tablet; Take 1 tablet by mouth once daily.  Dispense: 30 tablet; Refill: 0  -     dextroamphetamine-amphetamine (ADDERALL) 20 mg tablet; Take 1 tablet by mouth once daily.  Dispense: 30 tablet; Refill: 0    2. Mixed hyperlipidemia  Assessment & Plan:  On statin. Keep appts with cards. Refill sent in.    Orders:  -     Discontinue: atorvastatin (LIPITOR) 20 MG tablet; Take 1 tablet (20 mg total) by mouth once daily.  Dispense: 90 tablet; Refill: 3  -     atorvastatin (LIPITOR) 20 MG tablet; Take 1 tablet (20 mg total) by mouth once daily.  Dispense: 90 tablet; Refill: 3         Follow up in about 3 months (around 7/19/2023) for ADHD virtual visit.

## 2023-07-07 DIAGNOSIS — N52.9 ERECTILE DYSFUNCTION, UNSPECIFIED ERECTILE DYSFUNCTION TYPE: ICD-10-CM

## 2023-07-10 RX ORDER — SILDENAFIL 50 MG/1
TABLET, FILM COATED ORAL
Qty: 5 TABLET | Refills: 0 | OUTPATIENT
Start: 2023-07-10

## 2023-07-10 RX ORDER — SILDENAFIL 50 MG/1
50 TABLET, FILM COATED ORAL DAILY PRN
Qty: 5 TABLET | Refills: 11 | Status: SHIPPED | OUTPATIENT
Start: 2023-07-10 | End: 2024-01-30 | Stop reason: SDUPTHER

## 2023-07-10 NOTE — TELEPHONE ENCOUNTER
I have signed for the following orders AND/OR meds.  Please call the patient and ask the patient to schedule the testing AND/OR inform about any medications that were sent.    Medications Ordered This Encounter   Medications    sildenafiL (VIAGRA) 50 MG tablet     Sig: Take 1 tablet (50 mg total) by mouth daily as needed for Erectile Dysfunction.     Dispense:  5 tablet     Refill:  11

## 2023-07-10 NOTE — TELEPHONE ENCOUNTER
Please confirm which dose patient is taking... 100mg is listed on med list.  Refill is proposed for 50mg

## 2023-07-10 NOTE — TELEPHONE ENCOUNTER
It would be best to take lowest dose possible so if 50mg is working, I would stick to that dose.  100mg is max daily dosing. Which would he like me to refill?

## 2023-07-19 ENCOUNTER — OFFICE VISIT (OUTPATIENT)
Dept: FAMILY MEDICINE | Facility: CLINIC | Age: 58
End: 2023-07-19
Payer: COMMERCIAL

## 2023-07-19 DIAGNOSIS — Z00.00 WELLNESS EXAMINATION: ICD-10-CM

## 2023-07-19 DIAGNOSIS — Z12.5 PROSTATE CANCER SCREENING: ICD-10-CM

## 2023-07-19 DIAGNOSIS — E78.2 MIXED HYPERLIPIDEMIA: ICD-10-CM

## 2023-07-19 DIAGNOSIS — F90.9 ATTENTION DEFICIT HYPERACTIVITY DISORDER (ADHD), UNSPECIFIED ADHD TYPE: Primary | ICD-10-CM

## 2023-07-19 DIAGNOSIS — R05.9 COUGH, UNSPECIFIED TYPE: ICD-10-CM

## 2023-07-19 DIAGNOSIS — N52.9 ERECTILE DYSFUNCTION, UNSPECIFIED ERECTILE DYSFUNCTION TYPE: ICD-10-CM

## 2023-07-19 PROCEDURE — 1159F PR MEDICATION LIST DOCUMENTED IN MEDICAL RECORD: ICD-10-PCS | Mod: CPTII,95,, | Performed by: FAMILY MEDICINE

## 2023-07-19 PROCEDURE — 1160F PR REVIEW ALL MEDS BY PRESCRIBER/CLIN PHARMACIST DOCUMENTED: ICD-10-PCS | Mod: CPTII,95,, | Performed by: FAMILY MEDICINE

## 2023-07-19 PROCEDURE — 1159F MED LIST DOCD IN RCRD: CPT | Mod: CPTII,95,, | Performed by: FAMILY MEDICINE

## 2023-07-19 PROCEDURE — 99214 PR OFFICE/OUTPT VISIT, EST, LEVL IV, 30-39 MIN: ICD-10-PCS | Mod: 95,,, | Performed by: FAMILY MEDICINE

## 2023-07-19 PROCEDURE — 1160F RVW MEDS BY RX/DR IN RCRD: CPT | Mod: CPTII,95,, | Performed by: FAMILY MEDICINE

## 2023-07-19 PROCEDURE — 99214 OFFICE O/P EST MOD 30 MIN: CPT | Mod: 95,,, | Performed by: FAMILY MEDICINE

## 2023-07-19 RX ORDER — DEXTROAMPHETAMINE SACCHARATE, AMPHETAMINE ASPARTATE, DEXTROAMPHETAMINE SULFATE AND AMPHETAMINE SULFATE 5; 5; 5; 5 MG/1; MG/1; MG/1; MG/1
1 TABLET ORAL DAILY
Qty: 30 TABLET | Refills: 0 | Status: SHIPPED | OUTPATIENT
Start: 2023-07-19 | End: 2023-07-26

## 2023-07-19 RX ORDER — DEXTROAMPHETAMINE SACCHARATE, AMPHETAMINE ASPARTATE, DEXTROAMPHETAMINE SULFATE AND AMPHETAMINE SULFATE 5; 5; 5; 5 MG/1; MG/1; MG/1; MG/1
1 TABLET ORAL DAILY
Qty: 30 TABLET | Refills: 0 | Status: SHIPPED | OUTPATIENT
Start: 2023-08-18 | End: 2023-09-11 | Stop reason: SDUPTHER

## 2023-07-19 RX ORDER — DEXTROAMPHETAMINE SACCHARATE, AMPHETAMINE ASPARTATE, DEXTROAMPHETAMINE SULFATE AND AMPHETAMINE SULFATE 5; 5; 5; 5 MG/1; MG/1; MG/1; MG/1
1 TABLET ORAL DAILY
Qty: 30 TABLET | Refills: 0 | Status: SHIPPED | OUTPATIENT
Start: 2023-09-17 | End: 2023-10-13

## 2023-07-19 RX ORDER — HYDROCORTISONE 25 MG/G
CREAM TOPICAL 2 TIMES DAILY
Qty: 28 G | Refills: 5 | Status: SHIPPED | OUTPATIENT
Start: 2023-07-19 | End: 2023-07-19 | Stop reason: SDUPTHER

## 2023-07-19 RX ORDER — PROMETHAZINE HYDROCHLORIDE AND DEXTROMETHORPHAN HYDROBROMIDE 6.25; 15 MG/5ML; MG/5ML
5 SYRUP ORAL EVERY 4 HOURS PRN
Qty: 180 ML | Refills: 0 | Status: SHIPPED | OUTPATIENT
Start: 2023-07-19 | End: 2023-07-29

## 2023-07-19 RX ORDER — HYDROCORTISONE 25 MG/G
CREAM TOPICAL 2 TIMES DAILY
Qty: 28 G | Refills: 5 | Status: SHIPPED | OUTPATIENT
Start: 2023-07-19 | End: 2024-04-02 | Stop reason: SDUPTHER

## 2023-07-19 NOTE — ASSESSMENT & PLAN NOTE
[facility] reviewed. Controlled substance policy signed 10/2022.  Postdated prescription refills sent in for July, august, and September electronically to pharmacy.  Reminded patient this is a controlled medication and must be seen q3 months while on. Follow up visits can be virtual if needed.   Patient is agreeable to plan and verbalized understanding

## 2023-07-19 NOTE — ASSESSMENT & PLAN NOTE
Take flonase otc.  Will send in cough syrup as requested. Cautioned may cause drowsiness. Do not take before work or driving. Encouraged fluids. Contact clinic for concerns. Patient is agreeable to plan and verbalized understanding

## 2023-07-19 NOTE — PROGRESS NOTES
Subjective:        Patient ID: Ankur Alexander is a 58 y.o. male.    Chief Complaint: Follow-up (ADHD follow up )      Patient presents via telemed for adhd follow up and complaint.  He is due for his wellness visit in October.      He was evaluated by Danny Garvin 1/2022 and diagnosis of ADHD was confirmed.  He is currently on Adderall 20 mg once daily.   He does not take his medication on the weekends usually.  He feels as though the medication is helping him focus and would like to continue on his current dose.  He has 2 sons who have ADHD and are on medication.  Is due for refills.     C/o cough.  1 year old grand daughter spent the night and was sick.  Worse at night.  No fever.  Asking for med.     He has erectile dysfunction and is currently on Viagra as needed.  Levitra was too expensive.       Has rosacea. Seeing derm.  Using topicals.       He has right foot pain.  He states he has seen someone in the past for this and is on Mobic 15 mg as needed.       He has hyperlipidemia and is on atorvastatin.  His cardiologist is Dr. Esteban.      He had a colonoscopy 11/2022 with dr. Damon. With repeat due in 5 years.             The patient location is: home  The chief complaint leading to consultation is: adhd      Visit type: audiovisual     Face to Face time with patient: 15 minutes  20 minutes of total time spent on the encounter, which includes face to face time and non-face to face time preparing to see the patient (eg, review of tests), Obtaining and/or reviewing separately obtained history, Documenting clinical information in the electronic or other health record, Independently interpreting results (not separately reported) and communicating results to the patient/family/caregiver, or Care coordination (not separately reported).            Each patient to whom he or she provides medical services by telemedicine is:  (1) informed of the relationship between the physician and patient and the  respective role of any other health care provider with respect to management of the patient; and (2) notified that he or she may decline to receive medical services by telemedicine and may withdraw from such care at any time.       Review of Systems   Constitutional:  Negative for activity change and unexpected weight change.   HENT:  Negative for hearing loss, rhinorrhea and trouble swallowing.    Eyes:  Negative for discharge and visual disturbance.   Respiratory:  Positive for cough. Negative for chest tightness and wheezing.    Cardiovascular:  Negative for chest pain and palpitations.   Gastrointestinal:  Negative for blood in stool, constipation, diarrhea and vomiting.   Endocrine: Negative for polydipsia and polyuria.   Genitourinary:  Negative for difficulty urinating, hematuria and urgency.   Musculoskeletal:  Negative for arthralgias, joint swelling and neck pain.   Neurological:  Negative for weakness and headaches.   Psychiatric/Behavioral:  Negative for confusion and dysphoric mood.        Review of patient's allergies indicates:  No Known Allergies   There were no vitals filed for this visit.   Social History     Socioeconomic History    Marital status:    Tobacco Use    Smoking status: Never    Smokeless tobacco: Never   Substance and Sexual Activity    Alcohol use: Yes     Alcohol/week: 3.0 standard drinks     Types: 3 Drinks containing 0.5 oz of alcohol per week     Comment: Social occasions only    Drug use: Never    Sexual activity: Yes     Partners: Female      Family History   Problem Relation Age of Onset    No Known Problems Mother     No Known Problems Father     No Known Problems Sister     No Known Problems Brother     No Known Problems Other     Cancer Brother         Complete remission    Cancer Sister         Breast Cancer          Objective:     Physical Exam  Vitals and nursing note reviewed.   Constitutional:       Appearance: Normal appearance. He is normal weight.   HENT:       Head: Normocephalic.      Nose: Nose normal.   Eyes:      Extraocular Movements: Extraocular movements intact.   Pulmonary:      Effort: Pulmonary effort is normal.   Neurological:      General: No focal deficit present.      Mental Status: He is alert and oriented to person, place, and time. Mental status is at baseline.   Psychiatric:         Mood and Affect: Mood normal.     Current Outpatient Medications on File Prior to Visit   Medication Sig Dispense Refill    ALAHIST D 10-17.5 mg Tab TAKE 1 TABLET BY MOUTH EVERY 6 HOURS AS NEEDED NASAL CONGESTION, POSTNASAL      atorvastatin (LIPITOR) 20 MG tablet Take 1 tablet (20 mg total) by mouth once daily. 90 tablet 3    calcipotriene (DOVONOX) 0.005 % cream Apply topically 2 (two) times daily.      meloxicam (MOBIC) 15 MG tablet Take 1 tablet (15 mg total) by mouth once daily. With food as needed for pain 30 tablet 5    metronidazole 0.75% (METROCREAM) 0.75 % Crea Apply topically once daily.      mupirocin (BACTROBAN) 2 % ointment Apply topically 3 (three) times daily.      sildenafiL (VIAGRA) 50 MG tablet Take 1 tablet (50 mg total) by mouth daily as needed for Erectile Dysfunction. 5 tablet 11    [DISCONTINUED] augmented betamethasone dipropionate (DIPROLENE-AF) 0.05 % cream Apply topically 2 (two) times daily.      [DISCONTINUED] clobetasol 0.05% (TEMOVATE) 0.05 % Oint SMARTSIG:sparingly Topical Twice Daily      [DISCONTINUED] desoximetasone (TOPICORT) 0.25 % cream Apply topically.      [DISCONTINUED] dextroamphetamine-amphetamine (ADDERALL) 20 mg tablet Take 1 tablet by mouth once daily. 30 tablet 0    [DISCONTINUED] hydrocortisone 2.5 % cream Apply topically.      [DISCONTINUED] mometasone (ELOCON) 0.1 % solution Apply topically 2 (two) times daily.       No current facility-administered medications on file prior to visit.     Health Maintenance   Topic Date Due    Lipid Panel  01/12/2028    TETANUS VACCINE  04/03/2029    Hepatitis C Screening  Completed       Results for orders placed or performed in visit on 12/20/22   POCT Influenza A/B MOLECULAR   Result Value Ref Range    POC Molecular Influenza A Ag Negative Negative, Not Reported    POC Molecular Influenza B Ag Negative Negative, Not Reported     Acceptable Yes           Assessment & Plan:     Active Problem List with Overview Notes    Diagnosis Date Noted    Cough 07/19/2023    Rosacea 01/03/2023    Colon cancer screening 09/13/2022    Attention deficit hyperactivity disorder (ADHD) 06/13/2022    Erectile dysfunction 06/13/2022    Hyperlipidemia 06/13/2022    Right foot pain 06/13/2022       1. Attention deficit hyperactivity disorder (ADHD), unspecified ADHD type  Assessment & Plan:  [employer] reviewed. Controlled substance policy signed 10/2022.  Postdated prescription refills sent in for July, august, and September electronically to pharmacy.  Reminded patient this is a controlled medication and must be seen q3 months while on. Follow up visits can be virtual if needed.   Patient is agreeable to plan and verbalized understanding      Orders:  -     dextroamphetamine-amphetamine (ADDERALL) 20 mg tablet; Take 1 tablet by mouth once daily.  Dispense: 30 tablet; Refill: 0  -     dextroamphetamine-amphetamine (ADDERALL) 20 mg tablet; Take 1 tablet by mouth once daily.  Dispense: 30 tablet; Refill: 0  -     dextroamphetamine-amphetamine (ADDERALL) 20 mg tablet; Take 1 tablet by mouth once daily.  Dispense: 30 tablet; Refill: 0  -     CBC Auto Differential; Future; Expected date: 10/19/2023  -     Comprehensive Metabolic Panel; Future; Expected date: 10/19/2023  -     Lipid Panel; Future; Expected date: 10/19/2023  -     TSH; Future; Expected date: 10/19/2023  -     Urinalysis; Future; Expected date: 10/19/2023  -     Drug Screen, Urine; Future; Expected date: 10/19/2023  -     PSA, Screening; Future; Expected date: 10/19/2023    2. Cough, unspecified type  Assessment & Plan:  Take flonase otc.  Will  send in cough syrup as requested. Cautioned may cause drowsiness. Do not take before work or driving. Encouraged fluids. Contact clinic for concerns. Patient is agreeable to plan and verbalized understanding    Orders:  -     promethazine-dextromethorphan (PROMETHAZINE-DM) 6.25-15 mg/5 mL Syrp; Take 5 mLs by mouth every 4 (four) hours as needed (cough).  Dispense: 180 mL; Refill: 0    3. Wellness examination  -     CBC Auto Differential; Future; Expected date: 10/19/2023  -     Comprehensive Metabolic Panel; Future; Expected date: 10/19/2023  -     Lipid Panel; Future; Expected date: 10/19/2023  -     TSH; Future; Expected date: 10/19/2023  -     Urinalysis; Future; Expected date: 10/19/2023  -     Drug Screen, Urine; Future; Expected date: 10/19/2023  -     PSA, Screening; Future; Expected date: 10/19/2023    4. Prostate cancer screening  -     CBC Auto Differential; Future; Expected date: 10/19/2023  -     Comprehensive Metabolic Panel; Future; Expected date: 10/19/2023  -     Lipid Panel; Future; Expected date: 10/19/2023  -     TSH; Future; Expected date: 10/19/2023  -     Urinalysis; Future; Expected date: 10/19/2023  -     Drug Screen, Urine; Future; Expected date: 10/19/2023  -     PSA, Screening; Future; Expected date: 10/19/2023    5. Mixed hyperlipidemia  -     CBC Auto Differential; Future; Expected date: 10/19/2023  -     Comprehensive Metabolic Panel; Future; Expected date: 10/19/2023  -     Lipid Panel; Future; Expected date: 10/19/2023  -     TSH; Future; Expected date: 10/19/2023  -     Urinalysis; Future; Expected date: 10/19/2023  -     Drug Screen, Urine; Future; Expected date: 10/19/2023  -     PSA, Screening; Future; Expected date: 10/19/2023    6. Erectile dysfunction, unspecified erectile dysfunction type  -     CBC Auto Differential; Future; Expected date: 10/19/2023  -     Comprehensive Metabolic Panel; Future; Expected date: 10/19/2023  -     Lipid Panel; Future; Expected date:  10/19/2023  -     TSH; Future; Expected date: 10/19/2023  -     Urinalysis; Future; Expected date: 10/19/2023  -     Drug Screen, Urine; Future; Expected date: 10/19/2023  -     PSA, Screening; Future; Expected date: 10/19/2023    Other orders  -     Discontinue: hydrocortisone 2.5 % cream; Apply topically 2 (two) times daily. To affected area as directed.  Dispense: 28 g; Refill: 5  -     hydrocortisone 2.5 % cream; Apply topically 2 (two) times daily. To affected area as directed.  Dispense: 28 g; Refill: 5         Follow up in about 3 months (around 10/19/2023) for Wellness with Labs.

## 2023-07-26 ENCOUNTER — TELEPHONE (OUTPATIENT)
Dept: FAMILY MEDICINE | Facility: CLINIC | Age: 58
End: 2023-07-26
Payer: COMMERCIAL

## 2023-07-26 DIAGNOSIS — F90.9 ATTENTION DEFICIT HYPERACTIVITY DISORDER (ADHD), UNSPECIFIED ADHD TYPE: Primary | ICD-10-CM

## 2023-07-26 RX ORDER — DEXTROAMPHETAMINE SACCHARATE, AMPHETAMINE ASPARTATE, DEXTROAMPHETAMINE SULFATE AND AMPHETAMINE SULFATE 2.5; 2.5; 2.5; 2.5 MG/1; MG/1; MG/1; MG/1
20 TABLET ORAL DAILY
Qty: 60 TABLET | Refills: 0 | Status: SHIPPED | OUTPATIENT
Start: 2023-07-26 | End: 2023-08-25

## 2023-07-26 NOTE — TELEPHONE ENCOUNTER
----- Message from Jenny Harding sent at 7/26/2023  3:40 PM CDT -----  Regarding: meds  .Type:  Needs Medical Advice    Who Called: Pt  Symptoms (please be specific):    How long has patient had these symptoms:    Pharmacy name and phone #:   Would the patient rather a call back or a response via [facility]? Call back  Best Call Back Number: [phone]  Additional Information: Pt wants to see if his dextroamphetamine-amphetamine (ADDERALL) 10 mg tablet can be called in instead of 20 mg being that his preferred pharmacy is out of stock.

## 2023-07-26 NOTE — TELEPHONE ENCOUNTER
I have signed for the following orders AND/OR meds.  Please call the patient and ask the patient to schedule the testing AND/OR inform about any medications that were sent.        Medications Ordered This Encounter   Medications    dextroamphetamine-amphetamine (ADDERALL) 10 mg Tab     Sig: Take 2 tablets (20 mg total) by mouth once daily.     Dispense:  60 tablet     Refill:  0

## 2023-09-11 DIAGNOSIS — F90.9 ATTENTION DEFICIT HYPERACTIVITY DISORDER (ADHD), UNSPECIFIED ADHD TYPE: ICD-10-CM

## 2023-09-11 RX ORDER — DEXTROAMPHETAMINE SACCHARATE, AMPHETAMINE ASPARTATE, DEXTROAMPHETAMINE SULFATE AND AMPHETAMINE SULFATE 5; 5; 5; 5 MG/1; MG/1; MG/1; MG/1
1 TABLET ORAL DAILY
Qty: 30 TABLET | Refills: 0 | Status: SHIPPED | OUTPATIENT
Start: 2023-09-11 | End: 2023-10-13 | Stop reason: SDUPTHER

## 2023-09-11 NOTE — TELEPHONE ENCOUNTER
----- Message from Whitney Ramírez sent at 9/11/2023 10:27 AM CDT -----  Regarding: refill  Type:  RX Refill Request    Who Called: pt  Refill or New Rx:refill  RX Name and Strength:dextroamphetamine-amphetamine (ADDERALL) 20 mg tablet  How is the patient currently taking it? (ex. 1XDay):as needed  Is this a 30 day or 90 day RX:30  Preferred Pharmacy with phone number:[facility] on [address]  Local or Mail Order:local  Ordering Provider:roddy  Would the patient rather a call back or a response via [facility]?   Best Call Back Number:[phone]  Additional Information: pt called needing to have refill on medication . He stated that he is out and is needing to have script sent to [facility] on [address] being that they have it in stock

## 2023-10-12 ENCOUNTER — TELEPHONE (OUTPATIENT)
Dept: FAMILY MEDICINE | Facility: CLINIC | Age: 58
End: 2023-10-12
Payer: COMMERCIAL

## 2023-10-12 DIAGNOSIS — F90.9 ATTENTION DEFICIT HYPERACTIVITY DISORDER (ADHD), UNSPECIFIED ADHD TYPE: ICD-10-CM

## 2023-10-12 NOTE — TELEPHONE ENCOUNTER
----- Message from Jenny Harding sent at 10/12/2023  3:20 PM CDT -----  Regarding: med refill  .Type:  RX Refill Request    Who Called: Pt  Refill or New Rx:Refill  RX Name and Strength:dextroamphetamine-amphetamine (ADDERALL) 20 mg tablet  How is the patient currently taking it? (ex. 1XDay):1xday  Is this a 30 day or 90 day RX:30  Preferred Pharmacy with phone number:[facility]/pharmacy #[id number] - [place] - [address]  Local or Mail Order:local  Ordering Provider:Roxie  Would the patient rather a call back or a response via [facility]? Call back  Best Call Back Number:[phone]  Additional Information: [facility] pharmacy needed a new script.

## 2023-10-13 RX ORDER — DEXTROAMPHETAMINE SACCHARATE, AMPHETAMINE ASPARTATE, DEXTROAMPHETAMINE SULFATE AND AMPHETAMINE SULFATE 5; 5; 5; 5 MG/1; MG/1; MG/1; MG/1
1 TABLET ORAL DAILY
Qty: 30 TABLET | Refills: 0 | Status: SHIPPED | OUTPATIENT
Start: 2023-10-13 | End: 2023-11-16 | Stop reason: SDUPTHER

## 2023-10-13 NOTE — TELEPHONE ENCOUNTER
Starr reviewed.  Last fill 9/11/23  Lov 7/19/23. Has appt with jonelle 10/31/23. Will need to keep scheduled appt for future refills.     I have signed for the following orders AND/OR meds.  Please call the patient and ask the patient to schedule the testing AND/OR inform about any medications that were sent.        Medications Ordered This Encounter   Medications    dextroamphetamine-amphetamine (ADDERALL) 20 mg tablet     Sig: Take 1 tablet by mouth once daily.     Dispense:  30 tablet     Refill:  0

## 2023-10-30 ENCOUNTER — LAB VISIT (OUTPATIENT)
Dept: LAB | Facility: HOSPITAL | Age: 58
End: 2023-10-30
Attending: FAMILY MEDICINE
Payer: COMMERCIAL

## 2023-10-30 DIAGNOSIS — E78.2 MIXED HYPERLIPIDEMIA: ICD-10-CM

## 2023-10-30 DIAGNOSIS — Z00.00 WELLNESS EXAMINATION: ICD-10-CM

## 2023-10-30 DIAGNOSIS — Z12.5 PROSTATE CANCER SCREENING: ICD-10-CM

## 2023-10-30 DIAGNOSIS — F90.9 ATTENTION DEFICIT HYPERACTIVITY DISORDER (ADHD), UNSPECIFIED ADHD TYPE: ICD-10-CM

## 2023-10-30 DIAGNOSIS — N52.9 ERECTILE DYSFUNCTION, UNSPECIFIED ERECTILE DYSFUNCTION TYPE: ICD-10-CM

## 2023-10-30 LAB
ALBUMIN SERPL-MCNC: 4.1 G/DL (ref 3.4–4.8)
ALBUMIN/GLOB SERPL: 1.4 RATIO (ref 1.1–2)
ALP SERPL-CCNC: 101 UNIT/L (ref 40–150)
ALT SERPL-CCNC: 32 UNIT/L (ref 0–55)
AST SERPL-CCNC: 25 UNIT/L (ref 5–34)
BASOPHILS # BLD AUTO: 0.03 X10(3)/MCL
BASOPHILS NFR BLD AUTO: 0.6 %
BILIRUB SERPL-MCNC: 1.3 MG/DL
BUN SERPL-MCNC: 16.6 MG/DL (ref 8.4–25.7)
CALCIUM SERPL-MCNC: 9.3 MG/DL (ref 8.8–10)
CHLORIDE SERPL-SCNC: 107 MMOL/L (ref 98–107)
CHOLEST SERPL-MCNC: 210 MG/DL
CHOLEST/HDLC SERPL: 4 {RATIO} (ref 0–5)
CO2 SERPL-SCNC: 27 MMOL/L (ref 23–31)
CREAT SERPL-MCNC: 1.02 MG/DL (ref 0.73–1.18)
EOSINOPHIL # BLD AUTO: 0.16 X10(3)/MCL (ref 0–0.9)
EOSINOPHIL NFR BLD AUTO: 3 %
ERYTHROCYTE [DISTWIDTH] IN BLOOD BY AUTOMATED COUNT: 12.9 % (ref 11.5–17)
GFR SERPLBLD CREATININE-BSD FMLA CKD-EPI: >60 MLS/MIN/1.73/M2
GLOBULIN SER-MCNC: 2.9 GM/DL (ref 2.4–3.5)
GLUCOSE SERPL-MCNC: 127 MG/DL (ref 82–115)
HCT VFR BLD AUTO: 48.6 % (ref 42–52)
HDLC SERPL-MCNC: 59 MG/DL (ref 35–60)
HGB BLD-MCNC: 16.5 G/DL (ref 14–18)
IMM GRANULOCYTES # BLD AUTO: 0.01 X10(3)/MCL (ref 0–0.04)
IMM GRANULOCYTES NFR BLD AUTO: 0.2 %
LDLC SERPL CALC-MCNC: 106 MG/DL (ref 50–140)
LYMPHOCYTES # BLD AUTO: 1.38 X10(3)/MCL (ref 0.6–4.6)
LYMPHOCYTES NFR BLD AUTO: 25.6 %
MCH RBC QN AUTO: 30.8 PG (ref 27–31)
MCHC RBC AUTO-ENTMCNC: 34 G/DL (ref 33–36)
MCV RBC AUTO: 90.7 FL (ref 80–94)
MONOCYTES # BLD AUTO: 0.39 X10(3)/MCL (ref 0.1–1.3)
MONOCYTES NFR BLD AUTO: 7.2 %
NEUTROPHILS # BLD AUTO: 3.42 X10(3)/MCL (ref 2.1–9.2)
NEUTROPHILS NFR BLD AUTO: 63.4 %
NRBC BLD AUTO-RTO: 0 %
PLATELET # BLD AUTO: 171 X10(3)/MCL (ref 130–400)
PMV BLD AUTO: 9.8 FL (ref 7.4–10.4)
POTASSIUM SERPL-SCNC: 3.8 MMOL/L (ref 3.5–5.1)
PROT SERPL-MCNC: 7 GM/DL (ref 5.8–7.6)
PSA SERPL-MCNC: 1.85 NG/ML
RBC # BLD AUTO: 5.36 X10(6)/MCL (ref 4.7–6.1)
SODIUM SERPL-SCNC: 142 MMOL/L (ref 136–145)
TRIGL SERPL-MCNC: 224 MG/DL (ref 34–140)
TSH SERPL-ACNC: 1.49 UIU/ML (ref 0.35–4.94)
VLDLC SERPL CALC-MCNC: 45 MG/DL
WBC # SPEC AUTO: 5.39 X10(3)/MCL (ref 4.5–11.5)

## 2023-10-30 PROCEDURE — 36415 COLL VENOUS BLD VENIPUNCTURE: CPT

## 2023-10-30 PROCEDURE — 80061 LIPID PANEL: CPT

## 2023-10-30 PROCEDURE — 84443 ASSAY THYROID STIM HORMONE: CPT

## 2023-10-30 PROCEDURE — 84153 ASSAY OF PSA TOTAL: CPT

## 2023-10-30 PROCEDURE — 80053 COMPREHEN METABOLIC PANEL: CPT

## 2023-10-30 PROCEDURE — 85025 COMPLETE CBC W/AUTO DIFF WBC: CPT

## 2023-10-31 ENCOUNTER — TELEPHONE (OUTPATIENT)
Dept: FAMILY MEDICINE | Facility: CLINIC | Age: 58
End: 2023-10-31

## 2023-10-31 ENCOUNTER — OFFICE VISIT (OUTPATIENT)
Dept: FAMILY MEDICINE | Facility: CLINIC | Age: 58
End: 2023-10-31
Payer: COMMERCIAL

## 2023-10-31 VITALS
TEMPERATURE: 98 F | WEIGHT: 186.81 LBS | BODY MASS INDEX: 29.32 KG/M2 | SYSTOLIC BLOOD PRESSURE: 140 MMHG | RESPIRATION RATE: 18 BRPM | HEIGHT: 67 IN | DIASTOLIC BLOOD PRESSURE: 90 MMHG | HEART RATE: 71 BPM | OXYGEN SATURATION: 97 %

## 2023-10-31 DIAGNOSIS — Z00.00 WELLNESS EXAMINATION: Primary | ICD-10-CM

## 2023-10-31 DIAGNOSIS — N52.9 ERECTILE DYSFUNCTION, UNSPECIFIED ERECTILE DYSFUNCTION TYPE: ICD-10-CM

## 2023-10-31 DIAGNOSIS — E78.2 MIXED HYPERLIPIDEMIA: ICD-10-CM

## 2023-10-31 DIAGNOSIS — Z79.899 LONG-TERM USE OF HIGH-RISK MEDICATION: Primary | ICD-10-CM

## 2023-10-31 DIAGNOSIS — F90.9 ATTENTION DEFICIT HYPERACTIVITY DISORDER (ADHD), UNSPECIFIED ADHD TYPE: ICD-10-CM

## 2023-10-31 PROCEDURE — 99396 PR PREVENTIVE VISIT,EST,40-64: ICD-10-PCS | Mod: ,,, | Performed by: NURSE PRACTITIONER

## 2023-10-31 PROCEDURE — 1159F MED LIST DOCD IN RCRD: CPT | Mod: CPTII,,, | Performed by: NURSE PRACTITIONER

## 2023-10-31 PROCEDURE — 1159F PR MEDICATION LIST DOCUMENTED IN MEDICAL RECORD: ICD-10-PCS | Mod: CPTII,,, | Performed by: NURSE PRACTITIONER

## 2023-10-31 PROCEDURE — 3077F PR MOST RECENT SYSTOLIC BLOOD PRESSURE >= 140 MM HG: ICD-10-PCS | Mod: CPTII,,, | Performed by: NURSE PRACTITIONER

## 2023-10-31 PROCEDURE — 99396 PREV VISIT EST AGE 40-64: CPT | Mod: ,,, | Performed by: NURSE PRACTITIONER

## 2023-10-31 PROCEDURE — 3080F DIAST BP >= 90 MM HG: CPT | Mod: CPTII,,, | Performed by: NURSE PRACTITIONER

## 2023-10-31 PROCEDURE — 1160F PR REVIEW ALL MEDS BY PRESCRIBER/CLIN PHARMACIST DOCUMENTED: ICD-10-PCS | Mod: CPTII,,, | Performed by: NURSE PRACTITIONER

## 2023-10-31 PROCEDURE — 3080F PR MOST RECENT DIASTOLIC BLOOD PRESSURE >= 90 MM HG: ICD-10-PCS | Mod: CPTII,,, | Performed by: NURSE PRACTITIONER

## 2023-10-31 PROCEDURE — 3008F BODY MASS INDEX DOCD: CPT | Mod: CPTII,,, | Performed by: NURSE PRACTITIONER

## 2023-10-31 PROCEDURE — 3008F PR BODY MASS INDEX (BMI) DOCUMENTED: ICD-10-PCS | Mod: CPTII,,, | Performed by: NURSE PRACTITIONER

## 2023-10-31 PROCEDURE — 1160F RVW MEDS BY RX/DR IN RCRD: CPT | Mod: CPTII,,, | Performed by: NURSE PRACTITIONER

## 2023-10-31 PROCEDURE — 3077F SYST BP >= 140 MM HG: CPT | Mod: CPTII,,, | Performed by: NURSE PRACTITIONER

## 2023-10-31 NOTE — ASSESSMENT & PLAN NOTE
Currently on Adderall 20 mg once daily, with improved symptoms.   He does not take his medication on the weekends usually.  UDS is negative for Adderall; States last dose this morning.    Awaiting confirmatory testing.    Last refill 10/13/2023.  Keep appt with PCP for follow up

## 2023-10-31 NOTE — ASSESSMENT & PLAN NOTE
Labs previously done and reviewed with patient  Colonoscopy up to date (2022)  Influenza due; declines  Tdap up to date (2019)

## 2023-10-31 NOTE — PROGRESS NOTES
Subjective:      Patient ID: Ankur Alexander is a 58 y.o. White male      Chief Complaint: Annual Exam       Past Medical History:   Diagnosis Date    Attention deficit hyperactivity disorder (ADHD) 6/13/2022    Erectile dysfunction 6/13/2022    Hyperlipidemia     Rosacea 1/3/2023        HPI  Presents to clinic for Annual Wellness exam.    Labs previously done and reviewed with patient  Colonoscopy up to date (2022)  Influenza due; declines  Tdap up to date (2019)    ADHD:  He was evaluated by Danny Garvin 1/2022 and diagnosis of ADHD was confirmed.  He is currently on Adderall 20 mg once daily, with improved symptoms.   He does not take his medication on the weekends usually.  UDS is negative; states last dose this morning.  Awaiting confirmatory testing.  Last refill 10/13/2023.        Patient Care Team:  Lin Faustin MD as PCP - General (Family Medicine)  Harsha Esteban MD as Consulting Physician (Cardiology)  Jaydon Damon MD as Consulting Physician (Gastroenterology)  Dash Ventura MD (Dermatology)      Review of Systems   Constitutional:  Negative for chills, fatigue, fever and unexpected weight change.   HENT: Negative.     Eyes: Negative.    Respiratory: Negative.  Negative for shortness of breath.    Cardiovascular: Negative.  Negative for chest pain.   Gastrointestinal: Negative.    Endocrine: Negative.    Genitourinary: Negative.    Musculoskeletal: Negative.    Integumentary:  Negative.   Allergic/Immunologic: Negative.    Neurological: Negative.  Negative for weakness.   Hematological: Negative.    Psychiatric/Behavioral: Negative.     All other systems reviewed and are negative.          Objective:      Vitals:    10/31/23 1416   BP: (!) 140/90   Pulse:    Resp:    Temp:       Body mass index is 29.26 kg/m².     Physical Exam  Vitals reviewed.   Constitutional:       Appearance: He is not toxic-appearing.   HENT:      Head: Normocephalic.      Mouth/Throat:      Mouth:  Mucous membranes are moist.   Eyes:      Extraocular Movements: Extraocular movements intact.      Pupils: Pupils are equal, round, and reactive to light.   Cardiovascular:      Rate and Rhythm: Normal rate and regular rhythm.      Pulses: Normal pulses.      Heart sounds: Normal heart sounds.   Pulmonary:      Effort: Pulmonary effort is normal. No respiratory distress.      Breath sounds: Normal breath sounds.   Abdominal:      General: Bowel sounds are normal. There is no distension.      Palpations: Abdomen is soft.      Tenderness: There is no abdominal tenderness.   Musculoskeletal:         General: No tenderness. Normal range of motion.      Cervical back: Neck supple.   Skin:     General: Skin is warm and dry.   Neurological:      Mental Status: He is alert and oriented to person, place, and time.   Psychiatric:         Mood and Affect: Mood normal.            Current Outpatient Medications:     ALAHIST D 10-17.5 mg Tab, TAKE 1 TABLET BY MOUTH EVERY 6 HOURS AS NEEDED NASAL CONGESTION, POSTNASAL, Disp: , Rfl:     atorvastatin (LIPITOR) 20 MG tablet, Take 1 tablet (20 mg total) by mouth once daily., Disp: 90 tablet, Rfl: 3    calcipotriene (DOVONOX) 0.005 % cream, Apply topically 2 (two) times daily., Disp: , Rfl:     dextroamphetamine-amphetamine (ADDERALL) 20 mg tablet, Take 1 tablet by mouth once daily., Disp: 30 tablet, Rfl: 0    hydrocortisone 2.5 % cream, Apply topically 2 (two) times daily. To affected area as directed., Disp: 28 g, Rfl: 5    meloxicam (MOBIC) 15 MG tablet, Take 1 tablet (15 mg total) by mouth once daily. With food as needed for pain, Disp: 30 tablet, Rfl: 5    metronidazole 0.75% (METROCREAM) 0.75 % Crea, Apply topically once daily., Disp: , Rfl:     mupirocin (BACTROBAN) 2 % ointment, Apply topically 3 (three) times daily., Disp: , Rfl:     sildenafiL (VIAGRA) 50 MG tablet, Take 1 tablet (50 mg total) by mouth daily as needed for Erectile Dysfunction., Disp: 5 tablet, Rfl:  11    Assessment & Plan:     Problem List Items Addressed This Visit          Psychiatric    Attention deficit hyperactivity disorder (ADHD)     Currently on Adderall 20 mg once daily, with improved symptoms.   He does not take his medication on the weekends usually.  UDS is negative for Adderall; States last dose this morning.    Awaiting confirmatory testing.    Last refill 10/13/2023.  Keep appt with PCP for follow up              Cardiac/Vascular    Hyperlipidemia     Total cholesterol 210; ; Triglycerides 224  Currently on Atorvastatin 20 mg po daily;  States non-compliant with daily regimen  Instructed to take Statin daily  Follow low, low cholesterol diet  Will consider increase if no improvement            Renal/    Erectile dysfunction     Stable  Continue current meds  Keep appt with PCP for follow up              Other    Wellness examination - Primary     Labs previously done and reviewed with patient  Colonoscopy up to date (2022)  Influenza due; declines  Tdap up to date (2019)

## 2023-10-31 NOTE — ASSESSMENT & PLAN NOTE
Total cholesterol 210; ; Triglycerides 224  Currently on Atorvastatin 20 mg po daily;  States non-compliant with daily regimen  Instructed to take Statin daily  Follow low, low cholesterol diet  Will consider increase if no improvement

## 2023-11-16 DIAGNOSIS — F90.9 ATTENTION DEFICIT HYPERACTIVITY DISORDER (ADHD), UNSPECIFIED ADHD TYPE: ICD-10-CM

## 2023-11-16 RX ORDER — DEXTROAMPHETAMINE SACCHARATE, AMPHETAMINE ASPARTATE, DEXTROAMPHETAMINE SULFATE AND AMPHETAMINE SULFATE 5; 5; 5; 5 MG/1; MG/1; MG/1; MG/1
1 TABLET ORAL DAILY
Qty: 30 TABLET | Refills: 0 | Status: SHIPPED | OUTPATIENT
Start: 2023-11-16 | End: 2023-12-19 | Stop reason: SDUPTHER

## 2023-11-16 NOTE — TELEPHONE ENCOUNTER
----- Message from Margret Pastor sent at 11/16/2023  1:50 PM CST -----  .Type:  RX Refill Request    Who Called: pt  Refill or New Rx:refill  RX Name and Strength:dextroamphetamine-amphetamine (ADDERALL) 20 mg tablet  How is the patient currently taking it? 1XDay  Is this a 30 day or 90 day RX:30  Preferred Pharmacy with phone number:[facility] in [place]   Local or Mail Order:local   Ordering Provider:  Would the patient rather a call back or a response via [facility]?   Best Call Back Number:[phone]  Additional Information: refill request

## 2023-11-16 NOTE — TELEPHONE ENCOUNTER
Starr reviewed. Last fill 10/13/23    Lov 10/31/23 with kathie zimmer. Keep scheduled appts with kathie zimmer    I have signed for the following orders AND/OR meds.  Please call the patient and ask the patient to schedule the testing AND/OR inform about any medications that were sent.        Medications Ordered This Encounter   Medications    dextroamphetamine-amphetamine (ADDERALL) 20 mg tablet     Sig: Take 1 tablet by mouth once daily.     Dispense:  30 tablet     Refill:  0

## 2023-11-20 ENCOUNTER — OFFICE VISIT (OUTPATIENT)
Dept: URGENT CARE | Facility: CLINIC | Age: 58
End: 2023-11-20
Payer: COMMERCIAL

## 2023-11-20 VITALS
HEIGHT: 67 IN | TEMPERATURE: 99 F | DIASTOLIC BLOOD PRESSURE: 94 MMHG | HEART RATE: 79 BPM | WEIGHT: 181 LBS | BODY MASS INDEX: 28.41 KG/M2 | SYSTOLIC BLOOD PRESSURE: 154 MMHG | OXYGEN SATURATION: 96 % | RESPIRATION RATE: 16 BRPM

## 2023-11-20 DIAGNOSIS — J06.9 VIRAL URI: ICD-10-CM

## 2023-11-20 DIAGNOSIS — J34.89 SINUS PRESSURE: Primary | ICD-10-CM

## 2023-11-20 LAB
CTP QC/QA: YES
POC MOLECULAR INFLUENZA A AGN: NEGATIVE
POC MOLECULAR INFLUENZA B AGN: NEGATIVE

## 2023-11-20 PROCEDURE — 87502 INFLUENZA DNA AMP PROBE: CPT | Mod: QW,,, | Performed by: NURSE PRACTITIONER

## 2023-11-20 PROCEDURE — 99213 PR OFFICE/OUTPT VISIT, EST, LEVL III, 20-29 MIN: ICD-10-PCS | Mod: ,,, | Performed by: NURSE PRACTITIONER

## 2023-11-20 PROCEDURE — 87502 POCT INFLUENZA A/B MOLECULAR: ICD-10-PCS | Mod: QW,,, | Performed by: NURSE PRACTITIONER

## 2023-11-20 PROCEDURE — 99213 OFFICE O/P EST LOW 20 MIN: CPT | Mod: ,,, | Performed by: NURSE PRACTITIONER

## 2023-11-20 NOTE — PATIENT INSTRUCTIONS
Start taking an allergy pill daily such as claritin, zyrtec, allegrea or xyzal. Also start using a nasal steroid spray such as flonase or nasacort daily. If you are not being treated for high blood pressure, you can also take decongestant such as sudafed as needed. They can be purchased over the counter.Monitor for fever. Take tylenol/acetaminophen or ibuprofen as needed. Rest and hydrate. If symptoms persist or worsen, return to clinic or seek medical attention immediately.

## 2023-11-20 NOTE — PROGRESS NOTES
"Subjective:      Patient ID: Ankur Alexander is a 58 y.o. male.    Vitals:  height is 5' 7" (1.702 m) and weight is 82.1 kg (181 lb). His temperature is 98.6 °F (37 °C). His blood pressure is 154/94 (abnormal) and his pulse is 79. His respiration is 16 and oxygen saturation is 96%.     Chief Complaint: Nasal Congestion (Runny nose, sniffles, nasal congestion, sneeze since Thursday. Worse yesterday morning with eye pressure and fatigue. At home covid test negative last night. Tried taking vitamins (D & Zinc) and Sinex nose spray this morning.)    This is a 58-year-old male presenting with a 1 day history of sinus congestion postnasal drip denies fever body aches loss of taste or smell shortness of breath productive cough sore throat.        Constitution: Negative for fever.   HENT:  Positive for postnasal drip and sinus pain.       Objective:     Physical Exam   Constitutional: He is oriented to person, place, and time. He appears well-developed. He is cooperative.  Non-toxic appearance. He does not appear ill. No distress.   HENT:   Head: Normocephalic and atraumatic.   Ears:   Right Ear: Hearing, tympanic membrane, external ear and ear canal normal.   Left Ear: Hearing, tympanic membrane, external ear and ear canal normal.   Nose: Nose normal. No mucosal edema, rhinorrhea or nasal deformity. No epistaxis. Right sinus exhibits no maxillary sinus tenderness and no frontal sinus tenderness. Left sinus exhibits no maxillary sinus tenderness and no frontal sinus tenderness.   Mouth/Throat: Uvula is midline, oropharynx is clear and moist and mucous membranes are normal. No trismus in the jaw. Normal dentition. No uvula swelling. No oropharyngeal exudate, posterior oropharyngeal edema or posterior oropharyngeal erythema.   Eyes: Conjunctivae and lids are normal. No scleral icterus.   Neck: Trachea normal and phonation normal. Neck supple. No edema present. No erythema present. No neck rigidity present. "   Cardiovascular: Normal rate, regular rhythm, normal heart sounds and normal pulses.   Pulmonary/Chest: Effort normal and breath sounds normal. No respiratory distress. He has no decreased breath sounds. He has no rhonchi.   Abdominal: Normal appearance.   Musculoskeletal: Normal range of motion.         General: No deformity. Normal range of motion.   Neurological: He is alert and oriented to person, place, and time. He exhibits normal muscle tone. Coordination normal.   Skin: Skin is warm, dry, intact, not diaphoretic and not pale.   Psychiatric: His speech is normal and behavior is normal. Judgment and thought content normal.   Nursing note and vitals reviewed.         Previous History      Review of patient's allergies indicates:  No Known Allergies    Past Medical History:   Diagnosis Date    Attention deficit hyperactivity disorder (ADHD) 6/13/2022    Erectile dysfunction 6/13/2022    Hyperlipidemia     Rosacea 1/3/2023     Current Outpatient Medications   Medication Instructions    ALAHIST D 10-17.5 mg Tab TAKE 1 TABLET BY MOUTH EVERY 6 HOURS AS NEEDED NASAL CONGESTION, POSTNASAL    atorvastatin (LIPITOR) 20 mg, Oral, Daily    calcipotriene (DOVONOX) 0.005 % cream Topical (Top), 2 times daily    dextroamphetamine-amphetamine (ADDERALL) 20 mg tablet 1 tablet, Oral, Daily    hydrocortisone 2.5 % cream Topical (Top), 2 times daily, To affected area as directed.    meloxicam (MOBIC) 15 mg, Oral, Daily, With food as needed for pain    metronidazole 0.75% (METROCREAM) 0.75 % Crea Topical (Top), Daily    mupirocin (BACTROBAN) 2 % ointment Topical (Top), 3 times daily    sildenafiL (VIAGRA) 50 mg, Oral, Daily PRN     Past Surgical History:   Procedure Laterality Date    BRAIN SURGERY  8-10 years of age    Removal of blood clot in head    COLONOSCOPY  11/20/2012    FRACTURE SURGERY  3 years ago    spiral fracture of fibula in leg    LEG SURGERY      REMOVAL OF BLOOD CLOT           Social History     Tobacco Use     "Smoking status: Never     Passive exposure: Never    Smokeless tobacco: Never   Substance Use Topics    Alcohol use: Yes     Alcohol/week: 3.0 standard drinks of alcohol     Types: 3 Drinks containing 0.5 oz of alcohol per week     Comment: Social occasions only    Drug use: Never        Physical Exam      Vital Signs Reviewed   BP (!) 154/94   Pulse 79   Temp 98.6 °F (37 °C)   Resp 16   Ht 5' 7" (1.702 m)   Wt 82.1 kg (181 lb)   SpO2 96%   BMI 28.35 kg/m²        Procedures    Procedures     Labs     Results for orders placed or performed in visit on 11/20/23   POCT Influenza A/B Molecular   Result Value Ref Range    POC Molecular Influenza A Ag Negative Negative, Not Reported    POC Molecular Influenza B Ag Negative Negative, Not Reported     Acceptable Yes       Assessment:     1. Sinus pressure        Plan:     Drink plenty of fluids    Get plenty of rest.    Follow-up with your Primary Care Provider as needed.      Present to the Emergency Department with any significant change or worsening symptoms.  Sinus pressure  -     POCT Influenza A/B Molecular                    "

## 2023-12-08 ENCOUNTER — TELEPHONE (OUTPATIENT)
Dept: FAMILY MEDICINE | Facility: CLINIC | Age: 58
End: 2023-12-08
Payer: COMMERCIAL

## 2023-12-08 NOTE — TELEPHONE ENCOUNTER
----- Message from Nata Senegal sent at 12/8/2023 11:15 AM CST -----  .Type:  Patient Requesting Referral    Who Called: pt    Does the patient already have the specialty appointment scheduled?: no    If yes, what is the date of that appointment?: no    Referral to What Specialty: Physical therapy    Reason for Referral: arm pain rotary cuff pain    Does the patient want the referral with a specific physician?: [facility]    Is the specialist an [facility]?: non    Patient Requesting a Response?: yes    Would the patient rather a call back? Yes    Best Call Back Number: [phone]    Additional Information:  pt wants a referral for Physical Therapy

## 2023-12-19 DIAGNOSIS — F90.9 ATTENTION DEFICIT HYPERACTIVITY DISORDER (ADHD), UNSPECIFIED ADHD TYPE: ICD-10-CM

## 2023-12-19 RX ORDER — DEXTROAMPHETAMINE SACCHARATE, AMPHETAMINE ASPARTATE, DEXTROAMPHETAMINE SULFATE AND AMPHETAMINE SULFATE 5; 5; 5; 5 MG/1; MG/1; MG/1; MG/1
1 TABLET ORAL DAILY
Qty: 30 TABLET | Refills: 0 | Status: SHIPPED | OUTPATIENT
Start: 2023-12-19 | End: 2024-01-23 | Stop reason: SDUPTHER

## 2023-12-19 NOTE — TELEPHONE ENCOUNTER
----- Message from Jenny Harding sent at 12/19/2023 10:52 AM CST -----  Regarding: med refill  .Type:  RX Refill Request    Who Called: Pt  Refill or New Rx:refill  RX Name and Strength:dextroamphetamine-amphetamine (ADDERALL) 20 mg tablet   How is the patient currently taking it? (ex. 1XDay):  Is this a 30 day or 90 day RX:  Preferred Pharmacy with phone number:[facility]/pharmacy #[id number] - [place] - [address] [facility] [place]   Local or Mail Order:Local  Ordering Provider:Roxie  Would the patient rather a call back or a response via [facility]? Call back  Best Call Back Number:[phone]  Additional Information: would like a call when meds are called.

## 2024-01-22 ENCOUNTER — TELEPHONE (OUTPATIENT)
Dept: FAMILY MEDICINE | Facility: CLINIC | Age: 59
End: 2024-01-22
Payer: COMMERCIAL

## 2024-01-22 NOTE — TELEPHONE ENCOUNTER
----- Message from Norma Lowery sent at 1/22/2024  8:39 AM CST -----  .Type:  RX Refill Request    Who Called: pt  Refill or New Rx:refill  RX Name and Strength:dextroamphetamine-amphetamine (ADDERALL) 20 mg tablet   How is the patient currently taking it? (ex. 1XDay):1x day  Is this a 30 day or 90 day RX:30  [facility]  Local or Mail Order:local  Ordering Provider:Roxie  Would the patient rather a call back or a response via [facility]?   Best Call Back Number:  Additional Information: need refill

## 2024-01-23 DIAGNOSIS — F90.9 ATTENTION DEFICIT HYPERACTIVITY DISORDER (ADHD), UNSPECIFIED ADHD TYPE: ICD-10-CM

## 2024-01-23 RX ORDER — DEXTROAMPHETAMINE SACCHARATE, AMPHETAMINE ASPARTATE, DEXTROAMPHETAMINE SULFATE AND AMPHETAMINE SULFATE 5; 5; 5; 5 MG/1; MG/1; MG/1; MG/1
1 TABLET ORAL DAILY
Qty: 7 TABLET | Refills: 0 | Status: SHIPPED | OUTPATIENT
Start: 2024-01-23 | End: 2024-01-30 | Stop reason: SDUPTHER

## 2024-01-30 ENCOUNTER — OFFICE VISIT (OUTPATIENT)
Dept: FAMILY MEDICINE | Facility: CLINIC | Age: 59
End: 2024-01-30
Payer: COMMERCIAL

## 2024-01-30 VITALS
TEMPERATURE: 98 F | BODY MASS INDEX: 28.28 KG/M2 | HEIGHT: 67 IN | OXYGEN SATURATION: 96 % | HEART RATE: 78 BPM | RESPIRATION RATE: 20 BRPM | WEIGHT: 180.19 LBS | DIASTOLIC BLOOD PRESSURE: 77 MMHG | SYSTOLIC BLOOD PRESSURE: 133 MMHG

## 2024-01-30 DIAGNOSIS — I10 PRIMARY HYPERTENSION: ICD-10-CM

## 2024-01-30 DIAGNOSIS — N52.9 ERECTILE DYSFUNCTION, UNSPECIFIED ERECTILE DYSFUNCTION TYPE: ICD-10-CM

## 2024-01-30 DIAGNOSIS — F90.9 ATTENTION DEFICIT HYPERACTIVITY DISORDER (ADHD), UNSPECIFIED ADHD TYPE: Primary | ICD-10-CM

## 2024-01-30 PROCEDURE — 3075F SYST BP GE 130 - 139MM HG: CPT | Mod: CPTII,,, | Performed by: NURSE PRACTITIONER

## 2024-01-30 PROCEDURE — 1159F MED LIST DOCD IN RCRD: CPT | Mod: CPTII,,, | Performed by: NURSE PRACTITIONER

## 2024-01-30 PROCEDURE — 1160F RVW MEDS BY RX/DR IN RCRD: CPT | Mod: CPTII,,, | Performed by: NURSE PRACTITIONER

## 2024-01-30 PROCEDURE — 3008F BODY MASS INDEX DOCD: CPT | Mod: CPTII,,, | Performed by: NURSE PRACTITIONER

## 2024-01-30 PROCEDURE — 99214 OFFICE O/P EST MOD 30 MIN: CPT | Mod: ,,, | Performed by: NURSE PRACTITIONER

## 2024-01-30 PROCEDURE — 3078F DIAST BP <80 MM HG: CPT | Mod: CPTII,,, | Performed by: NURSE PRACTITIONER

## 2024-01-30 RX ORDER — DEXTROAMPHETAMINE SACCHARATE, AMPHETAMINE ASPARTATE, DEXTROAMPHETAMINE SULFATE AND AMPHETAMINE SULFATE 5; 5; 5; 5 MG/1; MG/1; MG/1; MG/1
1 TABLET ORAL DAILY
Qty: 30 TABLET | Refills: 0 | Status: SHIPPED | OUTPATIENT
Start: 2024-01-30 | End: 2024-04-11

## 2024-01-30 RX ORDER — DEXTROAMPHETAMINE SACCHARATE, AMPHETAMINE ASPARTATE, DEXTROAMPHETAMINE SULFATE AND AMPHETAMINE SULFATE 5; 5; 5; 5 MG/1; MG/1; MG/1; MG/1
1 TABLET ORAL DAILY
Qty: 30 TABLET | Refills: 0 | Status: SHIPPED | OUTPATIENT
Start: 2024-03-29

## 2024-01-30 RX ORDER — DEXTROAMPHETAMINE SACCHARATE, AMPHETAMINE ASPARTATE, DEXTROAMPHETAMINE SULFATE AND AMPHETAMINE SULFATE 5; 5; 5; 5 MG/1; MG/1; MG/1; MG/1
1 TABLET ORAL DAILY
Qty: 30 TABLET | Refills: 0 | Status: SHIPPED | OUTPATIENT
Start: 2024-02-29 | End: 2024-04-11

## 2024-01-30 RX ORDER — DEXTROAMPHETAMINE SACCHARATE, AMPHETAMINE ASPARTATE, DEXTROAMPHETAMINE SULFATE AND AMPHETAMINE SULFATE 5; 5; 5; 5 MG/1; MG/1; MG/1; MG/1
1 TABLET ORAL DAILY
Qty: 7 TABLET | Refills: 0 | Status: SHIPPED | OUTPATIENT
Start: 2024-03-29 | End: 2024-01-30

## 2024-01-30 RX ORDER — SILDENAFIL 100 MG/1
100 TABLET, FILM COATED ORAL DAILY PRN
Qty: 10 TABLET | Refills: 3 | Status: SHIPPED | OUTPATIENT
Start: 2024-01-30

## 2024-01-30 RX ORDER — CLOBETASOL PROPIONATE 0.5 MG/G
OINTMENT TOPICAL
COMMUNITY
Start: 2023-09-12

## 2024-01-30 RX ORDER — AMLODIPINE BESYLATE 5 MG/1
5 TABLET ORAL
COMMUNITY
Start: 2024-01-22

## 2024-01-30 NOTE — PROGRESS NOTES
Subjective:      Patient ID: Ankur Alexander is a 58 y.o. White male      Chief Complaint: Medication Refill (Adderall)       Past Medical History:   Diagnosis Date    Attention deficit hyperactivity disorder (ADHD) 06/13/2022    Erectile dysfunction 06/13/2022    Hyperlipidemia     Hypertension     Rosacea 01/03/2023        HPI  Presents to clinic for 3-month follow up ADHD.     ADHD:  He was evaluated by Danny Garvin 1/2022 and diagnosis of ADHD was confirmed.  He is currently on Adderall 20 mg once daily, with improved symptoms.   He does not take his medication on the weekends usually.  Medication is working well.  Denies any ADR.  Refills needed.     ED:  Currently taking Viagra prn.  Would like to increase to 100 mg prn.      HTN:  BP at goal.  Norvasc initiated per Cardiology on yesterday.  States compliance.  Denies any headaches, weakness, dizziness, CP, or SOB.  Denies any other problems.              Patient Care Team:  Lin Faustin MD as PCP - General (Family Medicine)  Harsha Esteban MD as Consulting Physician (Cardiology)  Jaydon Damon MD as Consulting Physician (Gastroenterology)  Dash Ventura MD (Dermatology)      Review of Systems   Constitutional:  Negative for chills, fatigue, fever and unexpected weight change.   HENT: Negative.     Eyes: Negative.    Respiratory: Negative.  Negative for shortness of breath.    Cardiovascular: Negative.  Negative for chest pain.   Gastrointestinal: Negative.    Endocrine: Negative.    Genitourinary: Negative.    Musculoskeletal: Negative.    Integumentary:  Negative.   Allergic/Immunologic: Negative.    Neurological: Negative.  Negative for weakness.   Hematological: Negative.    Psychiatric/Behavioral: Negative.     All other systems reviewed and are negative.          Objective:      Vitals:    01/30/24 0908   BP: 133/77   Pulse: 78   Resp: 20   Temp: 98 °F (36.7 °C)      Body mass index is 28.22 kg/m².     Physical Exam  Vitals  reviewed.   Constitutional:       Appearance: He is not toxic-appearing.   HENT:      Head: Normocephalic.      Mouth/Throat:      Mouth: Mucous membranes are moist.   Eyes:      Extraocular Movements: Extraocular movements intact.      Pupils: Pupils are equal, round, and reactive to light.   Cardiovascular:      Rate and Rhythm: Normal rate and regular rhythm.      Pulses: Normal pulses.      Heart sounds: Normal heart sounds.   Pulmonary:      Effort: Pulmonary effort is normal. No respiratory distress.      Breath sounds: Normal breath sounds.   Musculoskeletal:         General: No tenderness. Normal range of motion.      Cervical back: Neck supple.   Skin:     General: Skin is warm and dry.   Neurological:      Mental Status: He is alert and oriented to person, place, and time.   Psychiatric:         Mood and Affect: Mood normal.            Current Outpatient Medications:     amLODIPine (NORVASC) 5 MG tablet, Take 5 mg by mouth., Disp: , Rfl:     atorvastatin (LIPITOR) 20 MG tablet, Take 1 tablet (20 mg total) by mouth once daily., Disp: 90 tablet, Rfl: 3    calcipotriene (DOVONOX) 0.005 % cream, Apply topically 2 (two) times daily., Disp: , Rfl:     clobetasol 0.05% (TEMOVATE) 0.05 % Oint, SMARTSIG:sparingly Topical Twice Daily, Disp: , Rfl:     hydrocortisone 2.5 % cream, Apply topically 2 (two) times daily. To affected area as directed., Disp: 28 g, Rfl: 5    meloxicam (MOBIC) 15 MG tablet, Take 1 tablet (15 mg total) by mouth once daily. With food as needed for pain, Disp: 30 tablet, Rfl: 5    metronidazole 0.75% (METROCREAM) 0.75 % Crea, Apply topically once daily., Disp: , Rfl:     ALAHIST D 10-17.5 mg Tab, TAKE 1 TABLET BY MOUTH EVERY 6 HOURS AS NEEDED NASAL CONGESTION, POSTNASAL, Disp: , Rfl:     [START ON 2/29/2024] dextroamphetamine-amphetamine (ADDERALL) 20 mg tablet, Take 1 tablet by mouth once daily., Disp: 30 tablet, Rfl: 0    dextroamphetamine-amphetamine (ADDERALL) 20 mg tablet, Take 1 tablet by  mouth once daily., Disp: 30 tablet, Rfl: 0    [START ON 3/29/2024] dextroamphetamine-amphetamine (ADDERALL) 20 mg tablet, Take 1 tablet by mouth once daily., Disp: 30 tablet, Rfl: 0    mupirocin (BACTROBAN) 2 % ointment, Apply topically 3 (three) times daily., Disp: , Rfl:     sildenafiL (VIAGRA) 100 MG tablet, Take 1 tablet (100 mg total) by mouth daily as needed for Erectile Dysfunction., Disp: 10 tablet, Rfl: 3    Assessment & Plan:     Problem List Items Addressed This Visit          Psychiatric    Attention deficit hyperactivity disorder (ADHD) - Primary     Stable  Currently on Adderall 20 mg once daily; Rx sent;  reviewed  He does not take his medication on the weekends usually.  UDS on file.    Keep appt with PCP for follow up         Relevant Medications    dextroamphetamine-amphetamine (ADDERALL) 20 mg tablet (Start on 2/29/2024)    dextroamphetamine-amphetamine (ADDERALL) 20 mg tablet    dextroamphetamine-amphetamine (ADDERALL) 20 mg tablet (Start on 3/29/2024)       Cardiac/Vascular    Hypertension     BP at goal  Continue Norvasc 5 mg po daily  Daily BP check; bring log all clinic visits  Instructed to report to ED for any BP greater than 200/100, dizziness, syncope, CP, or SOB  Keep appt. With PCP for follow up  Patient is agreeable to plan and verbalizes understanding              Renal/    Erectile dysfunction     Stable  Continue Viagra as prescribed  Keep appt with PCP for follow up         Relevant Medications    sildenafiL (VIAGRA) 100 MG tablet

## 2024-01-30 NOTE — ASSESSMENT & PLAN NOTE
BP at goal  Continue Norvasc 5 mg po daily  Daily BP check; bring log all clinic visits  Instructed to report to ED for any BP greater than 200/100, dizziness, syncope, CP, or SOB  Keep appt. With PCP for follow up  Patient is agreeable to plan and verbalizes understanding

## 2024-01-30 NOTE — ASSESSMENT & PLAN NOTE
Stable  Currently on Adderall 20 mg once daily; Rx sent;  reviewed  He does not take his medication on the weekends usually.  UDS on file.    Keep appt with PCP for follow up

## 2024-02-04 ENCOUNTER — OFFICE VISIT (OUTPATIENT)
Dept: URGENT CARE | Facility: CLINIC | Age: 59
End: 2024-02-04
Payer: COMMERCIAL

## 2024-02-04 VITALS
RESPIRATION RATE: 18 BRPM | WEIGHT: 180 LBS | HEART RATE: 89 BPM | DIASTOLIC BLOOD PRESSURE: 83 MMHG | HEIGHT: 67 IN | BODY MASS INDEX: 28.25 KG/M2 | SYSTOLIC BLOOD PRESSURE: 124 MMHG | OXYGEN SATURATION: 97 % | TEMPERATURE: 99 F

## 2024-02-04 DIAGNOSIS — J02.9 SORE THROAT: ICD-10-CM

## 2024-02-04 DIAGNOSIS — J01.40 ACUTE NON-RECURRENT PANSINUSITIS: Primary | ICD-10-CM

## 2024-02-04 LAB
CTP QC/QA: YES
MOLECULAR STREP A: NEGATIVE
POC MOLECULAR INFLUENZA A AGN: NEGATIVE
POC MOLECULAR INFLUENZA B AGN: NEGATIVE
SARS-COV-2 RDRP RESP QL NAA+PROBE: NEGATIVE

## 2024-02-04 PROCEDURE — 87635 SARS-COV-2 COVID-19 AMP PRB: CPT | Mod: QW,,, | Performed by: FAMILY MEDICINE

## 2024-02-04 PROCEDURE — 87502 INFLUENZA DNA AMP PROBE: CPT | Mod: QW,,, | Performed by: FAMILY MEDICINE

## 2024-02-04 PROCEDURE — 99213 OFFICE O/P EST LOW 20 MIN: CPT | Mod: 25,,, | Performed by: FAMILY MEDICINE

## 2024-02-04 PROCEDURE — 87651 STREP A DNA AMP PROBE: CPT | Mod: QW,,, | Performed by: FAMILY MEDICINE

## 2024-02-04 PROCEDURE — 96372 THER/PROPH/DIAG INJ SC/IM: CPT | Mod: ,,, | Performed by: FAMILY MEDICINE

## 2024-02-04 RX ORDER — BENZONATATE 200 MG/1
200 CAPSULE ORAL 3 TIMES DAILY PRN
Qty: 15 CAPSULE | Refills: 0 | Status: SHIPPED | OUTPATIENT
Start: 2024-02-04 | End: 2024-02-09

## 2024-02-04 RX ORDER — BETAMETHASONE SODIUM PHOSPHATE AND BETAMETHASONE ACETATE 3; 3 MG/ML; MG/ML
6 INJECTION, SUSPENSION INTRA-ARTICULAR; INTRALESIONAL; INTRAMUSCULAR; SOFT TISSUE
Status: COMPLETED | OUTPATIENT
Start: 2024-02-04 | End: 2024-02-04

## 2024-02-04 RX ORDER — AMOXICILLIN AND CLAVULANATE POTASSIUM 875; 125 MG/1; MG/1
1 TABLET, FILM COATED ORAL EVERY 12 HOURS
Qty: 14 TABLET | Refills: 0 | Status: SHIPPED | OUTPATIENT
Start: 2024-02-04 | End: 2024-02-11

## 2024-02-04 RX ADMIN — BETAMETHASONE SODIUM PHOSPHATE AND BETAMETHASONE ACETATE 6 MG: 3; 3 INJECTION, SUSPENSION INTRA-ARTICULAR; INTRALESIONAL; INTRAMUSCULAR; SOFT TISSUE at 12:02

## 2024-02-04 NOTE — PATIENT INSTRUCTIONS
Discussed the physical finding , clinical diagnosis, condition and course  Adequate hydration and rest alternate Tylenol and ibuprofen for fever, body aches and sore throat.   Warm saltwater gargles for sore throat.   Claritin 10 mg or Zyrtec 10 mg for nasal congestion  Robitussin-DM for cough and cold as needed and as directed  [facility] IM today risk and benefits discussed voiced understanding  Antibiotics for worsening symptoms and signs of infection discussed in detail voiced understanding    Flu test negative, strep test negative, COVID-19 test negative    Call or return to clinic for any questions.  Follow up with primary

## 2024-02-04 NOTE — PROGRESS NOTES
"Subjective:      Patient ID: Ankur Alexander is a 58 y.o. male.    Vitals:  height is 5' 7" (1.702 m) and weight is 81.6 kg (180 lb). His oral temperature is 98.8 °F (37.1 °C). His blood pressure is 124/83 and his pulse is 89. His respiration is 18 and oxygen saturation is 97%.     Chief Complaint: Cough (Cough, mucus, hoarseness, fatigue, sore throat, x 2 days, negative home COVID test last night)    HPI:  58-year-old male known for multiple chronic medical condition present to clinic with 2 days' history of feeling feverish, coughing and congestion associated with hoarseness sore throat and feeling fatigued.  Home COVID-19 test negative.  No fever in the clinic.  No concerns of shortness a breath, chest pain or wheezing.  States dealing with sinus issues for some time, appears concerned with travel plans coming weekend  Requesting for cortisone injection as it helped in the past.  Understands the risk and benefits    ROS :  Constitutional : _ feverish, Body aches, Chills  Eyes : _No redness, drainage or pain  HENT_sore throat, postnasal drainage  Respiratory_no wheezing, no shortness of breath  Cardiovascular_no chest pain  Gastrointestinal_ No vomiting, No diarrhea, No abdominal pain  Musculoskeletal_no joint pain, no joint swelling  Integumentary_no skin rash   Objective:     Physical Exam  General : Alert and Oriented, No apparent distress, afebrile, sounds stuffy congested and nasal twang to voice  Neck - supple  HENT : Oropharynx no redness or swelling.  Bilateral TMs intact mild fluid no redness.   Respiratory : Bilateral equal breath sounds, nonlabored respirations  Cardiovascular : Rate, rhythm regular, normal volume pulse, no murmur  Gastrointestinal: Full abdomen, soft, nontender to palpate  Integumentary : Warm, Dry and no rash    Assessment:     1. Acute non-recurrent pansinusitis    2. Sore throat      Plan:   Discussed the physical finding , clinical diagnosis, condition and course  Adequate " hydration and rest alternate Tylenol and ibuprofen for fever, body aches and sore throat.   Warm saltwater gargles for sore throat.   Claritin 10 mg or Zyrtec 10 mg for nasal congestion  Robitussin-DM for cough and cold as needed and as directed  [facility] IM today risk and benefits discussed voiced understanding  Antibiotics for worsening symptoms and signs of infection discussed in detail voiced understanding    Flu test negative, strep test negative, COVID-19 test negative    Call or return to clinic for any questions.  Follow up with primary    Acute non-recurrent pansinusitis  -     betamethasone acetate-betamethasone sodium phosphate injection 6 mg  -     benzonatate (TESSALON) 200 MG capsule; Take 1 capsule (200 mg total) by mouth 3 (three) times daily as needed for Cough.  Dispense: 15 capsule; Refill: 0  -     amoxicillin-clavulanate 875-125mg (AUGMENTIN) 875-125 mg per tablet; Take 1 tablet by mouth every 12 (twelve) hours. for 7 days  Dispense: 14 tablet; Refill: 0    Sore throat  -     POCT COVID-19 Rapid Screening  -     POCT Influenza A/B Molecular  -     POCT Strep A, Molecular

## 2024-02-05 PROBLEM — Z00.00 WELLNESS EXAMINATION: Status: RESOLVED | Noted: 2022-06-13 | Resolved: 2024-02-05

## 2024-02-06 ENCOUNTER — TELEPHONE (OUTPATIENT)
Dept: URGENT CARE | Facility: CLINIC | Age: 59
End: 2024-02-06
Payer: COMMERCIAL

## 2024-02-06 NOTE — TELEPHONE ENCOUNTER
Try to reduce mucus with Flonase and saline nasal spray twice a day, saltwater gargle, honey to help coat the throat, humidifier by the bedside, sleep upright.

## 2024-03-07 ENCOUNTER — TELEPHONE (OUTPATIENT)
Dept: FAMILY MEDICINE | Facility: CLINIC | Age: 59
End: 2024-03-07
Payer: COMMERCIAL

## 2024-03-07 NOTE — TELEPHONE ENCOUNTER
Returned call to pt. Informed that RX for Adderall was sent to pharmacy 1/30/24 for 3 months. Pt advised to call pharmacy for refill.  Voiced understood.

## 2024-03-07 NOTE — TELEPHONE ENCOUNTER
----- Message from Jenny Harding sent at 3/7/2024  9:46 AM CST -----  Regarding: med refill  .Type:  RX Refill Request    Who Called: Pt  Refill or New Rx:Refill  RX Name and Strength:dextroamphetamine-amphetamine (ADDERALL) 20 mg tablet   How is the patient currently taking it? (ex. 1XDay):1xday  Is this a 30 day or 90 day RX:30  Preferred Pharmacy with phone number:[facility]/pharmacy #[id number] - [place] - [address] AT [facility] [address] [place]   Local or Mail Order:Local  Ordering Provider:Cathy  Would the patient rather a call back or a response via [facility]? Call back  Best Call Back Number:[phone]  Additional Information:

## 2024-04-02 ENCOUNTER — TELEPHONE (OUTPATIENT)
Dept: FAMILY MEDICINE | Facility: CLINIC | Age: 59
End: 2024-04-02
Payer: COMMERCIAL

## 2024-04-02 DIAGNOSIS — L71.9 ROSACEA: Primary | ICD-10-CM

## 2024-04-02 RX ORDER — HYDROCORTISONE 25 MG/G
CREAM TOPICAL 2 TIMES DAILY
Qty: 28 G | Refills: 5 | Status: SHIPPED | OUTPATIENT
Start: 2024-04-02

## 2024-04-02 NOTE — TELEPHONE ENCOUNTER
----- Message from Ava Shipman sent at 4/2/2024 11:00 AM CDT -----  Regarding: refill request  Type:  RX Refill Request    Who Called:  pt    Refill or New Rx: refill    RX Name and Strength: hydrocortisone 2.5 % cream     How is the patient currently taking it? (ex. 1XDay): twice day    Is this a 30 day or 90 day RX: 28 g    [facility]     Local or Mail Order: local    Ordering Provider: roddy    Would the patient rather a call back or a response via [facility]?  Yes if needed    Best Call Back Number: [phone]    Additional Information:  refill needed, please advise, thanks

## 2024-04-06 ENCOUNTER — OFFICE VISIT (OUTPATIENT)
Dept: URGENT CARE | Facility: CLINIC | Age: 59
End: 2024-04-06
Payer: COMMERCIAL

## 2024-04-06 VITALS
WEIGHT: 180 LBS | HEART RATE: 98 BPM | RESPIRATION RATE: 18 BRPM | TEMPERATURE: 101 F | HEIGHT: 68 IN | SYSTOLIC BLOOD PRESSURE: 119 MMHG | DIASTOLIC BLOOD PRESSURE: 74 MMHG | OXYGEN SATURATION: 92 % | BODY MASS INDEX: 27.28 KG/M2

## 2024-04-06 DIAGNOSIS — R39.198 DIFFICULTY URINATING: ICD-10-CM

## 2024-04-06 DIAGNOSIS — R50.9 FEVER, UNSPECIFIED FEVER CAUSE: ICD-10-CM

## 2024-04-06 DIAGNOSIS — J18.9 COMMUNITY ACQUIRED PNEUMONIA OF LEFT LOWER LOBE OF LUNG: Primary | ICD-10-CM

## 2024-04-06 DIAGNOSIS — R05.9 COUGH, UNSPECIFIED TYPE: ICD-10-CM

## 2024-04-06 LAB
BILIRUB UR QL STRIP: POSITIVE
CTP QC/QA: YES
CTP QC/QA: YES
GLUCOSE UR QL STRIP: NEGATIVE
KETONES UR QL STRIP: NEGATIVE
LEUKOCYTE ESTERASE UR QL STRIP: NEGATIVE
PH, POC UA: 7
POC BLOOD, URINE: NEGATIVE
POC MOLECULAR INFLUENZA A AGN: NEGATIVE
POC MOLECULAR INFLUENZA B AGN: NEGATIVE
POC NITRATES, URINE: NEGATIVE
PROT UR QL STRIP: POSITIVE
SARS-COV-2 RDRP RESP QL NAA+PROBE: NEGATIVE
SP GR UR STRIP: 1.01 (ref 1–1.03)
UROBILINOGEN UR STRIP-ACNC: 8 (ref 0.3–2.2)

## 2024-04-06 PROCEDURE — 87635 SARS-COV-2 COVID-19 AMP PRB: CPT | Mod: QW,,, | Performed by: NURSE PRACTITIONER

## 2024-04-06 PROCEDURE — 99214 OFFICE O/P EST MOD 30 MIN: CPT | Mod: ,,, | Performed by: NURSE PRACTITIONER

## 2024-04-06 PROCEDURE — 87502 INFLUENZA DNA AMP PROBE: CPT | Mod: QW,,, | Performed by: NURSE PRACTITIONER

## 2024-04-06 PROCEDURE — 81003 URINALYSIS AUTO W/O SCOPE: CPT | Mod: QW,,, | Performed by: NURSE PRACTITIONER

## 2024-04-06 RX ORDER — PREDNISONE 20 MG/1
40 TABLET ORAL DAILY
Qty: 14 TABLET | Refills: 0 | Status: SHIPPED | OUTPATIENT
Start: 2024-04-06 | End: 2024-04-13

## 2024-04-06 RX ORDER — CODEINE PHOSPHATE AND GUAIFENESIN 10; 100 MG/5ML; MG/5ML
10 SOLUTION ORAL EVERY 8 HOURS PRN
Qty: 180 ML | Refills: 0 | Status: SHIPPED | OUTPATIENT
Start: 2024-04-06 | End: 2024-04-16

## 2024-04-06 RX ORDER — DOXYCYCLINE HYCLATE 100 MG
100 TABLET ORAL EVERY 12 HOURS
Qty: 20 TABLET | Refills: 0 | Status: SHIPPED | OUTPATIENT
Start: 2024-04-06 | End: 2024-04-16

## 2024-04-06 RX ORDER — ALBUTEROL SULFATE 90 UG/1
2 AEROSOL, METERED RESPIRATORY (INHALATION) EVERY 6 HOURS PRN
Qty: 6.7 G | Refills: 2 | Status: SHIPPED | OUTPATIENT
Start: 2024-04-06 | End: 2025-04-06

## 2024-04-06 RX ORDER — HYDROCORTISONE 25 MG/G
CREAM TOPICAL 2 TIMES DAILY
COMMUNITY
Start: 2024-04-02 | End: 2024-04-11 | Stop reason: ALTCHOICE

## 2024-04-06 NOTE — PROGRESS NOTES
"Subjective:      Patient ID: Ankur Alexander is a 58 y.o. male.    Vitals:  height is 5' 8" (1.727 m) and weight is 81.6 kg (180 lb). His tympanic temperature is 100.9 °F (38.3 °C) (abnormal). His blood pressure is 119/74 and his pulse is 98. His respiration is 18 and oxygen saturation is 92% (abnormal).     Chief Complaint: Generalized Body Aches (Patient complains of fever and chills with body aches since midnight lastnight, He states he was struggling to urinate once but is not having pain when urinating. Patient states he would like to test for UTI because of the color of his urine. Patient denies congestion, N/V/D. Patient has not taken any medications for relief. )    58-year-old male presents to clinic complaining of generalized body aches that started during the night.  He states this morning he has had a mild cough.  He reports subjective fever and chills.  Patient states he did have some difficulty urinating during the night but that has resolved.  He denies any dysuria.  He has had no sore throat, nausea, vomiting or diarrhea.        Constitution: Positive for chills and fever.   HENT:  Negative for congestion and sore throat.    Cardiovascular:  Negative for chest pain.   Respiratory:  Positive for cough. Negative for sputum production and shortness of breath.    Gastrointestinal:  Negative for abdominal pain, vomiting and diarrhea.   Genitourinary:  Negative for dysuria.   Musculoskeletal:  Positive for joint pain and muscle ache.   Neurological:  Negative for dizziness and light-headedness.      Objective:     Physical Exam   Constitutional: He is oriented to person, place, and time.  Non-toxic appearance. No distress.   HENT:   Head: Normocephalic.   Nose: No congestion.   Mouth/Throat: Mucous membranes are moist. No oropharyngeal exudate or posterior oropharyngeal erythema.   Eyes: Extraocular movement intact   Neck: Neck supple.   Cardiovascular: Normal rate and regular rhythm. "   Pulmonary/Chest: Effort normal. He has rhonchi in the left lower field.   Abdominal: Soft. There is no abdominal tenderness. There is no guarding.   Musculoskeletal: Normal range of motion.         General: No swelling or tenderness. Normal range of motion.   Neurological: He is alert and oriented to person, place, and time. No cranial nerve deficit.   Skin: Skin is warm and dry. Capillary refill takes less than 2 seconds.   Psychiatric: His behavior is normal. Mood normal.   Nursing note and vitals reviewed.      Assessment:     1. Fever, unspecified fever cause    2. Difficulty urinating    3. Cough, unspecified type        Plan:   Doxycycline twice a day  Albuterol inhaler 2 puffs every 6 hours as needed for chest congestion/wheezing  Cheratussin AC as needed for cough, do not take and drive  Prednisone 2 tablets daily, take with food   Follow-up with your doctor next week   For shortness of breath or worsening of symptoms go to ER    Fever, unspecified fever cause    Difficulty urinating  -     POCT Urinalysis, Dipstick, Automated, W/O Scope    Cough, unspecified type  -     POCT Influenza A/B Molecular  -     POCT COVID-19 Rapid Screening  -     XR CHEST PA AND LATERAL; Future; Expected date: 04/06/2024    Influenza, COVID negative    Reading Physician Reading Date Result Priority   Michael Belle MD  [phone] 4/6/2024 STAT     Narrative & Impression  EXAMINATION:  XR CHEST PA AND LATERAL     CLINICAL HISTORY:  fever; Cough, unspecified     COMPARISON:  None     FINDINGS:  PA and lateral chest radiographs are provided for evaluation.     Cardiac silhouette is normal in size.     Scattered patchy opacities in the left mid lung with more consolidative changes seen in the retrocardiac medial left lung base concerning for consolidation/airspace disease.     No acute osseous findings.     Impression:     Scattered patchy opacities in the left mid lung with more consolidative changes seen in the retrocardiac  medial left lung base concerning for consolidation/airspace disease.  Recommend follow-up to resolution.        Electronically signed by: Michael Belle  Date:                                            04/06/2024  Time:                                           13:07      Medical Decision Making:   Initial Assessment:   58-year-old male complaining of generalized body aches with mild cough, fever and chills.  Respirations even and nonlabored.  Oxygen saturation 92% on room air.  Mild rhonchi in left lower fields  Differential Diagnosis:   Viral illness, pneumonia, UTI, COVID, influenza  Clinical Tests:   Lab Tests: Ordered and Reviewed       <> Summary of Lab: COVID, influenza negative  Radiological Study: Ordered and Reviewed  Urgent Care Management:  Patient in no respiratory distress. LLL infiltrates on CXR. Will treat with Doxycycline, prednisone, albuterol and Cheratussin AC. Patient to f/u with PCP next week. To Er for SOB.

## 2024-04-06 NOTE — PATIENT INSTRUCTIONS
Doxycycline twice a day  Albuterol inhaler 2 puffs every 6 hours as needed for chest congestion/wheezing  Cheratussin AC as needed for cough, do not take and drive  Prednisone 2 tablets daily, take with food   Follow-up with your doctor next week   For shortness of breath or worsening of symptoms go to ER

## 2024-04-08 ENCOUNTER — TELEPHONE (OUTPATIENT)
Dept: FAMILY MEDICINE | Facility: CLINIC | Age: 59
End: 2024-04-08
Payer: COMMERCIAL

## 2024-04-08 NOTE — TELEPHONE ENCOUNTER
----- Message from Ernestina Walker sent at 4/8/2024  8:55 AM CDT -----  Regarding: f/u appt  Type:  Needs Medical Advice    Who Called: pt wife   Symptoms (please be specific): pneumonia    How long has patient had these symptoms:  Saturday morning   Would the patient rather a call back or a response via [facility]? C/b   Best Call Back Number: [phone]  Additional Information: pt wife is calling because he needs a chest x-ray following up from a visit to the urgent care over the weekend. Pt has pneumonia and they are concerned about a certain area. Tried to get pt seen this week because they are asking for him to come in sooner; he has an appt on 4-25-24. Please call to advise.

## 2024-04-11 ENCOUNTER — OFFICE VISIT (OUTPATIENT)
Dept: FAMILY MEDICINE | Facility: CLINIC | Age: 59
End: 2024-04-11
Payer: COMMERCIAL

## 2024-04-11 ENCOUNTER — HOSPITAL ENCOUNTER (OUTPATIENT)
Dept: RADIOLOGY | Facility: HOSPITAL | Age: 59
Discharge: HOME | End: 2024-04-11
Attending: NURSE PRACTITIONER
Payer: COMMERCIAL

## 2024-04-11 VITALS
TEMPERATURE: 98 F | HEIGHT: 68 IN | RESPIRATION RATE: 20 BRPM | HEART RATE: 75 BPM | OXYGEN SATURATION: 97 % | SYSTOLIC BLOOD PRESSURE: 131 MMHG | DIASTOLIC BLOOD PRESSURE: 75 MMHG | WEIGHT: 187.88 LBS | BODY MASS INDEX: 28.47 KG/M2

## 2024-04-11 DIAGNOSIS — F90.9 ATTENTION DEFICIT HYPERACTIVITY DISORDER (ADHD), UNSPECIFIED ADHD TYPE: ICD-10-CM

## 2024-04-11 DIAGNOSIS — J18.9 COMMUNITY ACQUIRED PNEUMONIA, UNSPECIFIED LATERALITY: Primary | ICD-10-CM

## 2024-04-11 DIAGNOSIS — J18.9 COMMUNITY ACQUIRED PNEUMONIA, UNSPECIFIED LATERALITY: ICD-10-CM

## 2024-04-11 PROCEDURE — 3075F SYST BP GE 130 - 139MM HG: CPT | Mod: CPTII,,, | Performed by: NURSE PRACTITIONER

## 2024-04-11 PROCEDURE — 71046 X-RAY EXAM CHEST 2 VIEWS: CPT | Mod: TC

## 2024-04-11 PROCEDURE — 1160F RVW MEDS BY RX/DR IN RCRD: CPT | Mod: CPTII,,, | Performed by: NURSE PRACTITIONER

## 2024-04-11 PROCEDURE — 3008F BODY MASS INDEX DOCD: CPT | Mod: CPTII,,, | Performed by: NURSE PRACTITIONER

## 2024-04-11 PROCEDURE — 99214 OFFICE O/P EST MOD 30 MIN: CPT | Mod: ,,, | Performed by: NURSE PRACTITIONER

## 2024-04-11 PROCEDURE — 3078F DIAST BP <80 MM HG: CPT | Mod: CPTII,,, | Performed by: NURSE PRACTITIONER

## 2024-04-11 PROCEDURE — 1159F MED LIST DOCD IN RCRD: CPT | Mod: CPTII,,, | Performed by: NURSE PRACTITIONER

## 2024-04-11 RX ORDER — DEXTROAMPHETAMINE SACCHARATE, AMPHETAMINE ASPARTATE, DEXTROAMPHETAMINE SULFATE AND AMPHETAMINE SULFATE 5; 5; 5; 5 MG/1; MG/1; MG/1; MG/1
1 TABLET ORAL DAILY
Qty: 30 TABLET | Refills: 0 | Status: SHIPPED | OUTPATIENT
Start: 2024-07-11

## 2024-04-11 RX ORDER — DEXTROAMPHETAMINE SACCHARATE, AMPHETAMINE ASPARTATE, DEXTROAMPHETAMINE SULFATE AND AMPHETAMINE SULFATE 5; 5; 5; 5 MG/1; MG/1; MG/1; MG/1
1 TABLET ORAL DAILY
Qty: 30 TABLET | Refills: 0 | Status: SHIPPED | OUTPATIENT
Start: 2024-06-11

## 2024-04-11 RX ORDER — DEXTROAMPHETAMINE SACCHARATE, AMPHETAMINE ASPARTATE, DEXTROAMPHETAMINE SULFATE AND AMPHETAMINE SULFATE 5; 5; 5; 5 MG/1; MG/1; MG/1; MG/1
1 TABLET ORAL DAILY
Qty: 30 TABLET | Refills: 0 | Status: SHIPPED | OUTPATIENT
Start: 2024-05-11

## 2024-04-11 NOTE — PROGRESS NOTES
Subjective:      Patient ID: Ankur Alexander is a 58 y.o. White male      Chief Complaint: Follow-up (Urgent Care)       Past Medical History:   Diagnosis Date    Attention deficit hyperactivity disorder (ADHD) 06/13/2022    Erectile dysfunction 06/13/2022    Hyperlipidemia     Hypertension     Rosacea 01/03/2023        HPI  Presents to clinic for Urgent Care Follow up.    Seen in [facility] on 4/6/2024 with complaints of fever, chills, body aches.  Covid-19 and Influenza testing negative.  CXR to infiltrated; diagnosed with CAP.  Rx given for Doxycycline, Albuterol inhaler, and Prednisone.  States symptoms improved; still has cough.  Pt would like to change his cough medication to Hydrocodone.  He feels that providers do not want to order this medication but this has worked for him in the past.  Denies any fever, chills, body aches, CP, or SOB.  He has 2 pills remaining of Doxycyline.  Pt is requesting a repeat CXR.      ADHD:  He was evaluated by Danny Garvin 1/2022 and diagnosis of ADHD was confirmed.  He is currently on Adderall 20 mg once daily, with improved symptoms.   He does not take his medication on the weekends usually.  Medication is working well.  Denies any ADR.  Refills needed.           Patient Care Team:  Lin Faustin MD as PCP - General (Family Medicine)  Harsha Esteban MD as Consulting Physician (Cardiology)  Jaydon Damon MD as Consulting Physician (Gastroenterology)  Dash Ventura MD (Dermatology)      Review of Systems   Constitutional:  Negative for chills, fatigue, fever and unexpected weight change.   HENT: Negative.     Eyes: Negative.    Respiratory: Negative.  Negative for shortness of breath.    Cardiovascular: Negative.  Negative for chest pain.   Gastrointestinal: Negative.    Endocrine: Negative.    Genitourinary: Negative.    Musculoskeletal: Negative.    Integumentary:  Negative.   Allergic/Immunologic: Negative.    Neurological: Negative.  Negative for  weakness.   Hematological: Negative.    Psychiatric/Behavioral: Negative.     All other systems reviewed and are negative.          Objective:      Vitals:    04/11/24 1031   BP: 131/75   Pulse: 75   Resp: 20   Temp: 97.9 °F (36.6 °C)      Body mass index is 28.57 kg/m².     Physical Exam  Vitals reviewed.   Constitutional:       Appearance: He is not toxic-appearing.   HENT:      Head: Normocephalic.      Mouth/Throat:      Mouth: Mucous membranes are moist.   Eyes:      Extraocular Movements: Extraocular movements intact.      Pupils: Pupils are equal, round, and reactive to light.   Cardiovascular:      Rate and Rhythm: Normal rate and regular rhythm.      Pulses: Normal pulses.      Heart sounds: Normal heart sounds.   Pulmonary:      Effort: Pulmonary effort is normal. No respiratory distress.      Breath sounds: Normal breath sounds.      Comments: Lungs clear  Breath sounds greater on left than right  Musculoskeletal:         General: No tenderness. Normal range of motion.      Cervical back: Neck supple.   Skin:     General: Skin is warm and dry.   Neurological:      Mental Status: He is alert and oriented to person, place, and time.   Psychiatric:         Mood and Affect: Mood normal.            Current Outpatient Medications:     albuterol (PROVENTIL/VENTOLIN HFA) 90 mcg/actuation inhaler, Inhale 2 puffs into the lungs every 6 (six) hours as needed for Wheezing or Shortness of Breath., Disp: 6.7 g, Rfl: 2    amLODIPine (NORVASC) 5 MG tablet, Take 5 mg by mouth., Disp: , Rfl:     atorvastatin (LIPITOR) 20 MG tablet, Take 1 tablet (20 mg total) by mouth once daily., Disp: 90 tablet, Rfl: 3    calcipotriene (DOVONOX) 0.005 % cream, Apply topically 2 (two) times daily., Disp: , Rfl:     clobetasol 0.05% (TEMOVATE) 0.05 % Oint, SMARTSIG:sparingly Topical Twice Daily, Disp: , Rfl:     dextroamphetamine-amphetamine (ADDERALL) 20 mg tablet, Take 1 tablet by mouth once daily., Disp: 30 tablet, Rfl: 0    doxycycline  (VIBRA-TABS) 100 MG tablet, Take 1 tablet (100 mg total) by mouth every 12 (twelve) hours. for 10 days, Disp: 20 tablet, Rfl: 0    guaiFENesin-codeine 100-10 mg/5 ml (TUSSI-ORGANIDIN NR)  mg/5 mL syrup, Take 10 mLs by mouth every 8 (eight) hours as needed for Cough., Disp: 180 mL, Rfl: 0    hydrocortisone 2.5 % cream, Apply topically 2 (two) times daily. To affected area as directed., Disp: 28 g, Rfl: 5    meloxicam (MOBIC) 15 MG tablet, Take 1 tablet (15 mg total) by mouth once daily. With food as needed for pain, Disp: 30 tablet, Rfl: 5    predniSONE (DELTASONE) 20 MG tablet, Take 2 tablets (40 mg total) by mouth once daily. for 7 days, Disp: 14 tablet, Rfl: 0    sildenafiL (VIAGRA) 100 MG tablet, Take 1 tablet (100 mg total) by mouth daily as needed for Erectile Dysfunction., Disp: 10 tablet, Rfl: 3    [START ON 7/11/2024] dextroamphetamine-amphetamine (ADDERALL) 20 mg tablet, Take 1 tablet by mouth once daily., Disp: 30 tablet, Rfl: 0    [START ON 6/11/2024] dextroamphetamine-amphetamine (ADDERALL) 20 mg tablet, Take 1 tablet by mouth once daily., Disp: 30 tablet, Rfl: 0    [START ON 5/11/2024] dextroamphetamine-amphetamine (ADDERALL) 20 mg tablet, Take 1 tablet by mouth once daily., Disp: 30 tablet, Rfl: 0    Assessment & Plan:     Problem List Items Addressed This Visit          Psychiatric    Attention deficit hyperactivity disorder (ADHD)     Stable  Currently on Adderall 20 mg once daily; Rx sent;  reviewed; Rx sent  He does not take his medication on the weekends usually.  UDS on file.    Keep appt with PCP for follow up         Relevant Medications    dextroamphetamine-amphetamine (ADDERALL) 20 mg tablet (Start on 7/11/2024)    dextroamphetamine-amphetamine (ADDERALL) 20 mg tablet (Start on 6/11/2024)    dextroamphetamine-amphetamine (ADDERALL) 20 mg tablet (Start on 5/11/2024)       Pulmonary    Community acquired pneumonia - Primary     Overall improved  Pt is requesting Hydrocodone/Guaifenesin  (combination not available in any pharmacy)  States he will continue Guaifenesin-codeine OTC (although he thinks this makes him cough) prn cough  He has 2 pills remaining of Doxycyline; Instructed to take until completed  Pt is requesting a repeat CXR  Explained that we normally repeat after 4-6 weeks; however pt concerned about an area on RLL (Of note, pneumonia noted LLL)  CXR ordered for today per patient's request; will call with results  Can consider CT thorax if needed  Instructed to continue to monitor symptoms  Report to ED for any CP, SOB, and/or worsening symptoms  Pt is agreeable to plan and verbalizes understanding               Relevant Orders    X-Ray Chest PA And Lateral        Prior to the patient's arrival on the same day, I spent (5) minutes reviewing chart. Once in the exam room with the patient, I spent (15 ) minutes in the room with the member performing a history and exam as well as reviewing the test results and recommendations with the patient. After leaving the exam room, I spent an additional (8 ) minutes completing the electronic health record. The total time spent that day caring for the member is (30) minutes, and this time - including the breakdown - is documented in the medical record.

## 2024-04-11 NOTE — ASSESSMENT & PLAN NOTE
Overall improved  Pt is requesting Hydrocodone/Guaifenesin (combination not available in any pharmacy)  States he will continue Guaifenesin-codeine OTC (although he thinks this makes him cough) prn cough  He has 2 pills remaining of Doxycyline; Instructed to take until completed  Pt is requesting a repeat CXR  Explained that we normally repeat after 4-6 weeks; however pt concerned about an area on RLL (Of note, pneumonia noted LLL)  CXR ordered for today per patient's request; will call with results  Can consider CT thorax if needed  Instructed to continue to monitor symptoms  Report to ED for any CP, SOB, and/or worsening symptoms  Pt is agreeable to plan and verbalizes understanding

## 2024-04-11 NOTE — ASSESSMENT & PLAN NOTE
Stable  Currently on Adderall 20 mg once daily; Rx sent;  reviewed; Rx sent  He does not take his medication on the weekends usually.  UDS on file.    Keep appt with PCP for follow up

## 2024-04-12 DIAGNOSIS — J18.9 COMMUNITY ACQUIRED PNEUMONIA, UNSPECIFIED LATERALITY: ICD-10-CM

## 2024-04-12 DIAGNOSIS — R91.8 OPACITY OF LUNG ON IMAGING STUDY: Primary | ICD-10-CM

## 2024-05-28 ENCOUNTER — TELEPHONE (OUTPATIENT)
Dept: FAMILY MEDICINE | Facility: CLINIC | Age: 59
End: 2024-05-28
Payer: COMMERCIAL

## 2024-05-28 NOTE — TELEPHONE ENCOUNTER
----- Message from Marilu Cali LPN sent at 5/28/2024  9:53 AM CDT -----    ----- Message -----  From: Melvin Pascual  Sent: 5/28/2024   9:51 AM CDT  To: [facility] Staff    .Type:  Needs Medical Advice    Who Called: Ankur   Symptoms (please be specific):    How long has patient had these symptoms:    Pharmacy name and phone #:    Would the patient rather a call back or a response via [facility]?   Best Call Back Number: [phone]  Additional Information: Patient called requested to speak with Ms. Lynch, re: did not tell me what he needed.

## 2024-07-15 PROBLEM — J18.9 COMMUNITY ACQUIRED PNEUMONIA: Status: RESOLVED | Noted: 2024-04-11 | Resolved: 2024-07-15

## 2024-07-16 ENCOUNTER — OFFICE VISIT (OUTPATIENT)
Dept: FAMILY MEDICINE | Facility: CLINIC | Age: 59
End: 2024-07-16
Payer: COMMERCIAL

## 2024-07-16 DIAGNOSIS — Z00.00 WELLNESS EXAMINATION: ICD-10-CM

## 2024-07-16 DIAGNOSIS — F90.9 ATTENTION DEFICIT HYPERACTIVITY DISORDER (ADHD), UNSPECIFIED ADHD TYPE: Primary | ICD-10-CM

## 2024-07-16 DIAGNOSIS — E78.2 MIXED HYPERLIPIDEMIA: ICD-10-CM

## 2024-07-16 DIAGNOSIS — Z12.5 PROSTATE CANCER SCREENING: ICD-10-CM

## 2024-07-16 DIAGNOSIS — I10 PRIMARY HYPERTENSION: ICD-10-CM

## 2024-07-16 PROCEDURE — 1160F RVW MEDS BY RX/DR IN RCRD: CPT | Mod: CPTII,95,, | Performed by: FAMILY MEDICINE

## 2024-07-16 PROCEDURE — 1159F MED LIST DOCD IN RCRD: CPT | Mod: CPTII,95,, | Performed by: FAMILY MEDICINE

## 2024-07-16 PROCEDURE — 99214 OFFICE O/P EST MOD 30 MIN: CPT | Mod: 95,,, | Performed by: FAMILY MEDICINE

## 2024-07-16 RX ORDER — DEXTROAMPHETAMINE SACCHARATE, AMPHETAMINE ASPARTATE, DEXTROAMPHETAMINE SULFATE AND AMPHETAMINE SULFATE 5; 5; 5; 5 MG/1; MG/1; MG/1; MG/1
1 TABLET ORAL DAILY
Qty: 30 TABLET | Refills: 0 | Status: SHIPPED | OUTPATIENT
Start: 2024-09-14 | End: 2024-10-14

## 2024-07-16 RX ORDER — DEXTROAMPHETAMINE SACCHARATE, AMPHETAMINE ASPARTATE, DEXTROAMPHETAMINE SULFATE AND AMPHETAMINE SULFATE 5; 5; 5; 5 MG/1; MG/1; MG/1; MG/1
1 TABLET ORAL DAILY
Qty: 30 TABLET | Refills: 0 | Status: SHIPPED | OUTPATIENT
Start: 2024-08-15 | End: 2024-09-14

## 2024-07-16 RX ORDER — DEXTROAMPHETAMINE SACCHARATE, AMPHETAMINE ASPARTATE, DEXTROAMPHETAMINE SULFATE AND AMPHETAMINE SULFATE 5; 5; 5; 5 MG/1; MG/1; MG/1; MG/1
1 TABLET ORAL DAILY
Qty: 30 TABLET | Refills: 0 | Status: SHIPPED | OUTPATIENT
Start: 2024-07-16 | End: 2024-08-15

## 2024-07-16 NOTE — ASSESSMENT & PLAN NOTE
On adderall 20 MG DAILY. Doing well.     [facility] reviewed. Controlled substance policy signed 10/2022. Need to update at next in office appt.     Postdated prescription refills printed for July, August and September. Reminded patient that lost, misplaced or stolen rx cannot be replaced     Reminded patient this is a controlled medication and must be seen q3 months while on. Follow up visits can be virtual if needed.   Patient is agreeable to plan and verbalized understanding    Uds ordered with wellness labs.

## 2024-08-16 DIAGNOSIS — N52.9 ERECTILE DYSFUNCTION, UNSPECIFIED ERECTILE DYSFUNCTION TYPE: ICD-10-CM

## 2024-08-19 RX ORDER — SILDENAFIL 100 MG/1
100 TABLET, FILM COATED ORAL DAILY PRN
Qty: 10 TABLET | Refills: 0 | Status: SHIPPED | OUTPATIENT
Start: 2024-08-19

## 2024-09-23 DIAGNOSIS — N52.9 ERECTILE DYSFUNCTION, UNSPECIFIED ERECTILE DYSFUNCTION TYPE: ICD-10-CM

## 2024-09-23 RX ORDER — SILDENAFIL 100 MG/1
100 TABLET, FILM COATED ORAL DAILY PRN
Qty: 10 TABLET | Refills: 0 | Status: SHIPPED | OUTPATIENT
Start: 2024-09-23

## 2024-11-01 ENCOUNTER — TELEPHONE (OUTPATIENT)
Dept: FAMILY MEDICINE | Facility: CLINIC | Age: 59
End: 2024-11-01

## 2024-11-04 ENCOUNTER — OFFICE VISIT (OUTPATIENT)
Dept: FAMILY MEDICINE | Facility: CLINIC | Age: 59
End: 2024-11-04
Payer: COMMERCIAL

## 2024-11-04 VITALS — SYSTOLIC BLOOD PRESSURE: 140 MMHG | DIASTOLIC BLOOD PRESSURE: 80 MMHG

## 2024-11-04 DIAGNOSIS — E78.2 MIXED HYPERLIPIDEMIA: ICD-10-CM

## 2024-11-04 DIAGNOSIS — N52.9 ERECTILE DYSFUNCTION, UNSPECIFIED ERECTILE DYSFUNCTION TYPE: ICD-10-CM

## 2024-11-04 DIAGNOSIS — I10 PRIMARY HYPERTENSION: ICD-10-CM

## 2024-11-04 DIAGNOSIS — Z12.5 PROSTATE CANCER SCREENING: ICD-10-CM

## 2024-11-04 DIAGNOSIS — F90.9 ATTENTION DEFICIT HYPERACTIVITY DISORDER (ADHD), UNSPECIFIED ADHD TYPE: Primary | ICD-10-CM

## 2024-11-04 DIAGNOSIS — Z00.00 WELLNESS EXAMINATION: ICD-10-CM

## 2024-11-04 PROCEDURE — 1160F RVW MEDS BY RX/DR IN RCRD: CPT | Mod: CPTII,95,, | Performed by: FAMILY MEDICINE

## 2024-11-04 PROCEDURE — 1159F MED LIST DOCD IN RCRD: CPT | Mod: CPTII,95,, | Performed by: FAMILY MEDICINE

## 2024-11-04 PROCEDURE — 3079F DIAST BP 80-89 MM HG: CPT | Mod: CPTII,95,, | Performed by: FAMILY MEDICINE

## 2024-11-04 PROCEDURE — 99214 OFFICE O/P EST MOD 30 MIN: CPT | Mod: 95,,, | Performed by: FAMILY MEDICINE

## 2024-11-04 PROCEDURE — 3077F SYST BP >= 140 MM HG: CPT | Mod: CPTII,95,, | Performed by: FAMILY MEDICINE

## 2024-11-04 RX ORDER — DEXTROAMPHETAMINE SACCHARATE, AMPHETAMINE ASPARTATE, DEXTROAMPHETAMINE SULFATE AND AMPHETAMINE SULFATE 5; 5; 5; 5 MG/1; MG/1; MG/1; MG/1
1 TABLET ORAL DAILY
Qty: 30 TABLET | Refills: 0 | Status: SHIPPED | OUTPATIENT
Start: 2025-01-03 | End: 2024-11-05

## 2024-11-04 RX ORDER — DEXTROAMPHETAMINE SACCHARATE, AMPHETAMINE ASPARTATE, DEXTROAMPHETAMINE SULFATE AND AMPHETAMINE SULFATE 5; 5; 5; 5 MG/1; MG/1; MG/1; MG/1
1 TABLET ORAL DAILY
Qty: 30 TABLET | Refills: 0 | Status: SHIPPED | OUTPATIENT
Start: 2024-11-04 | End: 2024-11-05

## 2024-11-04 RX ORDER — SILDENAFIL 100 MG/1
100 TABLET, FILM COATED ORAL DAILY PRN
Qty: 10 TABLET | Refills: 11 | Status: SHIPPED | OUTPATIENT
Start: 2024-11-04

## 2024-11-04 RX ORDER — DEXTROAMPHETAMINE SACCHARATE, AMPHETAMINE ASPARTATE, DEXTROAMPHETAMINE SULFATE AND AMPHETAMINE SULFATE 5; 5; 5; 5 MG/1; MG/1; MG/1; MG/1
1 TABLET ORAL DAILY
Qty: 30 TABLET | Refills: 0 | Status: SHIPPED | OUTPATIENT
Start: 2024-12-04 | End: 2024-11-05

## 2024-11-04 NOTE — ASSESSMENT & PLAN NOTE
On adderall 20 MG DAILY. Doing well.     [facility] reviewed. Controlled substance policy signed 10/2022. Need to update at next in office appt.     Asking for printed rx.  Will print rx for November, December and January. Patient will come to office to . He is going out of town.  Reminded patient that printed rx cannot be replaced if lost, stolen or misplaced.       Reminded patient this is a controlled medication and must be seen q3 months while on.  Patient is agreeable to plan and verbalized understanding    Uds ordered with wellness labs. Advised patient next appt must be in office to sign controlled substance policy and must complete labs/UDS prior to appt for further refills to be approved.

## 2024-11-04 NOTE — ASSESSMENT & PLAN NOTE
Monitor bp at home. Reports compliance with prescriptions.  Keep appts with cards. Contact clinic for concerns. Patient is agreeable to plan and verbalized understanding

## 2024-11-04 NOTE — PROGRESS NOTES
Subjective:        Patient ID: Ankur Alexander is a 59 y.o. male.    Chief Complaint: No chief complaint on file.      Patient presents via telemed for adhd follow up and complaint.  He is due for his wellness visit in October.      He was evaluated by Danny Garvin 1/2022 and diagnosis of ADHD was confirmed.  He is currently on Adderall 20 mg once daily. Current pharmacy does not have in stock.  Would like to come  printed rxs.    He does not take his medication on the weekends usually.  He feels as though the medication is helping him focus and would like to continue on his current dose.  He has 2 sons who have ADHD and are on medication.  Is due for refills.      His bp was slightly elevated.  He walked to his office before our visit and usually his wife checks it for him and is better.        He has erectile dysfunction and is currently on Viagra as needed. Needs refill.  Levitra was too expensive.               Has rosacea. Seeing derm.  Using topicals.       He has right foot pain.  He states he has seen someone in the past for this and is on Mobic 15 mg as needed.       He has hyperlipidemia and is on atorvastatin.  His cardiologist is Dr. Esteban.      He had a colonoscopy 11/2022 with dr. Damon. With repeat due in 5 years.                 The patient location is: home  The chief complaint leading to consultation is: adhd      Visit type: audiovisual     Face to Face time with patient: 15 minutes  20 minutes of total time spent on the encounter, which includes face to face time and non-face to face time preparing to see the patient (eg, review of tests), Obtaining and/or reviewing separately obtained history, Documenting clinical information in the electronic or other health record, Independently interpreting results (not separately reported) and communicating results to the patient/family/caregiver, or Care coordination (not separately reported).            Each patient to whom he or she  provides medical services by telemedicine is:  (1) informed of the relationship between the physician and patient and the respective role of any other health care provider with respect to management of the patient; and (2) notified that he or she may decline to receive medical services by telemedicine and may withdraw from such care at any time.              Review of Systems   Constitutional:  Negative for activity change and unexpected weight change.   HENT:  Negative for hearing loss, rhinorrhea and trouble swallowing.    Eyes:  Negative for discharge and visual disturbance.   Respiratory:  Negative for chest tightness and wheezing.    Cardiovascular:  Negative for chest pain and palpitations.   Gastrointestinal:  Negative for blood in stool, constipation, diarrhea and vomiting.   Endocrine: Negative for polydipsia and polyuria.   Genitourinary:  Negative for difficulty urinating, hematuria and urgency.   Musculoskeletal:  Negative for arthralgias, joint swelling and neck pain.   Neurological:  Negative for weakness and headaches.   Psychiatric/Behavioral:  Negative for confusion and dysphoric mood.          Review of patient's allergies indicates:  No Known Allergies   Vitals:    11/04/24 0950 11/04/24 0951   BP: (!) 145/81 (!) 140/80      Social History     Socioeconomic History    Marital status:    Tobacco Use    Smoking status: Never     Passive exposure: Never    Smokeless tobacco: Never   Substance and Sexual Activity    Alcohol use: Yes     Alcohol/week: 3.0 standard drinks of alcohol     Types: 3 Drinks containing 0.5 oz of alcohol per week     Comment: Social occasions only    Drug use: Never    Sexual activity: Yes     Partners: Female     Social Drivers of Health     Financial Resource Strain: Patient Declined (7/16/2024)    Overall Financial Resource Strain (CARDIA)     Difficulty of Paying Living Expenses: Patient declined   Food Insecurity: Patient Declined (7/16/2024)    Hunger Vital Sign      Worried About Running Out of Food in the Last Year: Patient declined     Ran Out of Food in the Last Year: Patient declined   Physical Activity: Sufficiently Active (7/16/2024)    Exercise Vital Sign     Days of Exercise per Week: 3 days     Minutes of Exercise per Session: 60 min   Stress: No Stress Concern Present (7/16/2024)    [employer] [place] [employer] - Occupational Stress Questionnaire     Feeling of Stress : Not at all   Housing Stability: Unknown (7/16/2024)    Housing Stability Vital Sign     Unable to Pay for Housing in the Last Year: Patient declined      Family History   Problem Relation Name Age of Onset    No Known Problems Mother      No Known Problems Father      No Known Problems Sister      Cancer Sister Lin Resendez         Breast Cancer    No Known Problems Brother      Cancer Brother Eusebio Alexander         Complete remission    No Known Problems Other            Objective:     Physical Exam  Vitals and nursing note reviewed.   Constitutional:       Appearance: Normal appearance. He is normal weight.   HENT:      Head: Normocephalic.      Nose: Nose normal.   Pulmonary:      Effort: Pulmonary effort is normal.   Neurological:      General: No focal deficit present.      Mental Status: He is alert and oriented to person, place, and time. Mental status is at baseline.   Psychiatric:         Mood and Affect: Mood normal.       Current Outpatient Medications on File Prior to Visit   Medication Sig Dispense Refill    amLODIPine (NORVASC) 5 MG tablet Take 5 mg by mouth.      atorvastatin (LIPITOR) 20 MG tablet Take 1 tablet (20 mg total) by mouth once daily. 90 tablet 3    calcipotriene (DOVONOX) 0.005 % cream Apply topically 2 (two) times daily.      clobetasol 0.05% (TEMOVATE) 0.05 % Oint SMARTSIG:sparingly Topical Twice Daily      hydrocortisone 2.5 % cream Apply topically 2 (two) times daily. To affected area as directed. 28 g 5    meloxicam (MOBIC) 15 MG tablet Take 1 tablet  (15 mg total) by mouth once daily. With food as needed for pain 30 tablet 5    [DISCONTINUED] dextroamphetamine-amphetamine (ADDERALL) 20 mg tablet Take 1 tablet by mouth once daily. 30 tablet 0    [DISCONTINUED] sildenafiL (VIAGRA) 100 MG tablet TAKE ONE TABLET BY MOUTH DAILY AS NEEDED FOR ERECTILE DYSFUNCTION 10 tablet 0     No current facility-administered medications on file prior to visit.     Health Maintenance   Topic Date Due    Shingles Vaccine (1 of 2) 11/04/2025 (Originally 6/1/2013)    Lipid Panel  01/17/2029    TETANUS VACCINE  04/03/2029    Colorectal Cancer Screening  12/02/2032    Hepatitis C Screening  Completed      Results for orders placed or performed in visit on 04/06/24   POCT Urinalysis, Dipstick, Automated, W/O Scope    Collection Time: 04/06/24 11:38 AM   Result Value Ref Range    POC Blood, Urine Negative Negative    POC Bilirubin, Urine Positive (A) Negative    POC Urobilinogen, Urine 8 (A) 0.3 - 2.2    POC Ketones, Urine Negative Negative    POC Protein, Urine Positive (A) Negative    POC Nitrates, Urine Negative Negative    POC Glucose, Urine Negative Negative    pH, UA 7     POC Specific Gravity, Urine 1.010 1.003 - 1.029    POC Leukocytes, Urine Negative Negative   POCT Influenza A/B Molecular    Collection Time: 04/06/24 11:59 AM   Result Value Ref Range    POC Molecular Influenza A Ag Negative Negative, Not Reported    POC Molecular Influenza B Ag Negative Negative, Not Reported     Acceptable Yes    POCT COVID-19 Rapid Screening    Collection Time: 04/06/24 11:59 AM   Result Value Ref Range    POC Rapid COVID Negative Negative     Acceptable Yes           Assessment & Plan:     Active Problem List with Overview Notes    Diagnosis Date Noted    Hypertension 01/30/2024    Cough 07/19/2023    Rosacea 01/03/2023    Colon cancer screening 09/13/2022    Attention deficit hyperactivity disorder (ADHD) 06/13/2022    Erectile dysfunction 06/13/2022     Hyperlipidemia 06/13/2022    Right foot pain 06/13/2022       1. Attention deficit hyperactivity disorder (ADHD), unspecified ADHD type  Assessment & Plan:  On adderall 20 MG DAILY. Doing well.     Starr reviewed. Controlled substance policy signed 10/2022. Need to update at next in office appt.     Asking for printed rx.  Will print rx for November, December and January. Patient will come to office to . He is going out of town.  Reminded patient that printed rx cannot be replaced if lost, stolen or misplaced.       Reminded patient this is a controlled medication and must be seen q3 months while on.  Patient is agreeable to plan and verbalized understanding    Uds ordered with wellness labs. Advised patient next appt must be in office to sign controlled substance policy and must complete labs/UDS prior to appt for further refills to be approved.       Orders:  -     CBC Auto Differential; Future; Expected date: 11/04/2024  -     Comprehensive Metabolic Panel; Future; Expected date: 11/04/2024  -     Lipid Panel; Future; Expected date: 11/04/2024  -     TSH; Future; Expected date: 11/04/2024  -     Hemoglobin A1C; Future; Expected date: 11/04/2024  -     Urinalysis; Future; Expected date: 11/04/2024  -     Drug Screen, Urine; Future; Expected date: 11/04/2024  -     PSA, Screening; Future; Expected date: 11/04/2024  -     dextroamphetamine-amphetamine (ADDERALL) 20 mg tablet; Take 1 tablet by mouth once daily.  Dispense: 30 tablet; Refill: 0  -     dextroamphetamine-amphetamine (ADDERALL) 20 mg tablet; Take 1 tablet by mouth once daily.  Dispense: 30 tablet; Refill: 0  -     dextroamphetamine-amphetamine (ADDERALL) 20 mg tablet; Take 1 tablet by mouth once daily.  Dispense: 30 tablet; Refill: 0    2. Erectile dysfunction, unspecified erectile dysfunction type  Assessment & Plan:  Doing well on viagra. Refill sent to pharm on file.     Orders:  -     sildenafiL (VIAGRA) 100 MG tablet; Take 1 tablet (100 mg  total) by mouth daily as needed for Erectile Dysfunction.  Dispense: 10 tablet; Refill: 11    3. Primary hypertension  Assessment & Plan:  Monitor bp at home. Reports compliance with prescriptions.  Keep appts with cards. Contact clinic for concerns. Patient is agreeable to plan and verbalized understanding    Orders:  -     CBC Auto Differential; Future; Expected date: 11/04/2024  -     Comprehensive Metabolic Panel; Future; Expected date: 11/04/2024  -     Lipid Panel; Future; Expected date: 11/04/2024  -     TSH; Future; Expected date: 11/04/2024  -     Hemoglobin A1C; Future; Expected date: 11/04/2024  -     Urinalysis; Future; Expected date: 11/04/2024  -     Drug Screen, Urine; Future; Expected date: 11/04/2024  -     PSA, Screening; Future; Expected date: 11/04/2024    4. Wellness examination  -     CBC Auto Differential; Future; Expected date: 11/04/2024  -     Comprehensive Metabolic Panel; Future; Expected date: 11/04/2024  -     Lipid Panel; Future; Expected date: 11/04/2024  -     TSH; Future; Expected date: 11/04/2024  -     Hemoglobin A1C; Future; Expected date: 11/04/2024  -     Urinalysis; Future; Expected date: 11/04/2024  -     Drug Screen, Urine; Future; Expected date: 11/04/2024  -     PSA, Screening; Future; Expected date: 11/04/2024    5. Mixed hyperlipidemia  -     CBC Auto Differential; Future; Expected date: 11/04/2024  -     Comprehensive Metabolic Panel; Future; Expected date: 11/04/2024  -     Lipid Panel; Future; Expected date: 11/04/2024  -     TSH; Future; Expected date: 11/04/2024  -     Hemoglobin A1C; Future; Expected date: 11/04/2024  -     Urinalysis; Future; Expected date: 11/04/2024  -     Drug Screen, Urine; Future; Expected date: 11/04/2024  -     PSA, Screening; Future; Expected date: 11/04/2024    6. Prostate cancer screening  -     CBC Auto Differential; Future; Expected date: 11/04/2024  -     Comprehensive Metabolic Panel; Future; Expected date: 11/04/2024  -     Lipid  Panel; Future; Expected date: 11/04/2024  -     TSH; Future; Expected date: 11/04/2024  -     Hemoglobin A1C; Future; Expected date: 11/04/2024  -     Urinalysis; Future; Expected date: 11/04/2024  -     Drug Screen, Urine; Future; Expected date: 11/04/2024  -     PSA, Screening; Future; Expected date: 11/04/2024         Follow up before end of year for wellness with labs and uds.

## 2024-11-05 ENCOUNTER — TELEPHONE (OUTPATIENT)
Dept: FAMILY MEDICINE | Facility: CLINIC | Age: 59
End: 2024-11-05
Payer: COMMERCIAL

## 2024-11-05 DIAGNOSIS — F90.9 ATTENTION DEFICIT HYPERACTIVITY DISORDER (ADHD), UNSPECIFIED ADHD TYPE: Primary | ICD-10-CM

## 2024-11-05 RX ORDER — DEXTROAMPHETAMINE SACCHARATE, AMPHETAMINE ASPARTATE, DEXTROAMPHETAMINE SULFATE AND AMPHETAMINE SULFATE 2.5; 2.5; 2.5; 2.5 MG/1; MG/1; MG/1; MG/1
20 TABLET ORAL DAILY
Qty: 60 TABLET | Refills: 0 | Status: SHIPPED | OUTPATIENT
Start: 2024-11-05 | End: 2024-12-05

## 2024-11-05 NOTE — TELEPHONE ENCOUNTER
Requesting rx 10 mg tabs due to that being in stock at [facility]. Ok with les. He asked what he should do with printed script due to this month and next month all being on the same page

## 2024-11-05 NOTE — TELEPHONE ENCOUNTER
He will need to bring the rx back so we can destroy them before I can send in new rx to his requested pharmacy

## 2024-11-05 NOTE — TELEPHONE ENCOUNTER
----- Message from Ava sent at 11/5/2024  9:22 AM CST -----  Who Called: Ankur Alexander    Caller is requesting assistance/information from provider's office.    Symptoms (please be specific): n/a   How long has patient had these symptoms:  n/a  List of preferred pharmacies on file (remove unneeded): [unfilled]  If different, enter pharmacy into here including location and phone number: n/a      Preferred Method of Contact: Phone Call  Patient's Preferred Phone Number on File: [phone]   Best Call Back Number, if different:  Additional Information: medical advice, pt called and stated no pharmacy any where has medication (  dextroamphetamine-amphetamine (ADDERALL) 20 mg tablet 30 tablet) and that [facility] [address] [facility] the medication (dextroamphetamine-amphetamine (ADDERALL) 10 mg tablet 60 tablet) pt is also requesting to discuss other medications as well, #pt is also stated he will need Rx for today due to leaving for out of town trip,# please advise, thanks

## 2024-11-05 NOTE — TELEPHONE ENCOUNTER
Patient notified. He said then please print new script and he will come by with the old one to destroy and  new one today

## 2024-12-11 NOTE — PROGRESS NOTES
Subjective:        Patient ID: Ankur Alexander is a 59 y.o. male.    Chief Complaint: Follow-up (Med refills )      Patient presents via telemed for adhd follow up and complaint.  He is due for his wellness visit in October.      He was evaluated by Danny Garvin 1/2022 and diagnosis of ADHD was confirmed.  He is currently on Adderall 20 mg once daily. He does not take his medication on the weekends usually.  Picked up rx for nov, dec and jan after visit 11/4/24. Confirmed [facility] does not have his paper rx. Advised pt to look at home to find his printed rxs for December and January.  Reminded patient we cannot replace rx since they were printed for him at 11/2024 visit. Has had trouble with pharmacy having dose in stock. Lost dose of adderall was about 1 weeks ago.    He feels as though the medication is helping him focus and would like to continue on his current dose.  He has 2 sons who have ADHD and are on medication.        C/o right neck pain x 1 month. Radiates down neck.  No trauma or injury.  Otc nsaids did not work. Heat helps.        He has erectile dysfunction and is currently on Viagra as needed. Levitra was too expensive.       Has rosacea. Seeing derm.  Using topicals.       He has right foot pain.  He states he has seen someone in the past for this and is on Mobic 15 mg as needed.       He has hyperlipidemia and is on atorvastatin. Has htn. On norvasc.  Needs refills.  His cardiologist is Dr. Esteban.      He had a colonoscopy 11/2022 with dr. Damon. With repeat due in 5 years.       He is not allergic to any medications. He does not smoke.                  Review of Systems   Constitutional: Negative.    HENT: Negative.     Eyes: Negative.    Respiratory: Negative.     Cardiovascular: Negative.    Gastrointestinal: Negative.    Endocrine: Negative.    Genitourinary: Negative.    Musculoskeletal:  Positive for neck pain.   Skin: Negative.    Allergic/Immunologic: Negative.    Neurological:  "Negative.    Hematological: Negative.    Psychiatric/Behavioral: Negative.     All other systems reviewed and are negative.        Review of patient's allergies indicates:  No Known Allergies   Vitals:    12/12/24 0843   BP: 126/80   BP Location: Left arm   Patient Position: Sitting   Pulse: 79   Resp: 16   Temp: 97.9 °F (36.6 °C)   TempSrc: Temporal   SpO2: 98%   Weight: 76.7 kg (169 lb)   Height: 5' 8" (1.727 m)      Social History     Socioeconomic History    Marital status:    Tobacco Use    Smoking status: Never     Passive exposure: Never    Smokeless tobacco: Never   Substance and Sexual Activity    Alcohol use: Yes     Alcohol/week: 3.0 standard drinks of alcohol     Types: 3 Drinks containing 0.5 oz of alcohol per week     Comment: Social occasions only    Drug use: Never    Sexual activity: Yes     Partners: Female     Social Drivers of Health     Financial Resource Strain: Patient Declined (7/16/2024)    Overall Financial Resource Strain (CARDIA)     Difficulty of Paying Living Expenses: Patient declined   Food Insecurity: Patient Declined (7/16/2024)    Hunger Vital Sign     Worried About Running Out of Food in the Last Year: Patient declined     Ran Out of Food in the Last Year: Patient declined   Physical Activity: Sufficiently Active (7/16/2024)    Exercise Vital Sign     Days of Exercise per Week: 3 days     Minutes of Exercise per Session: 60 min   Stress: No Stress Concern Present (7/16/2024)    [employer] - Occupational Stress Questionnaire     Feeling of Stress : Not at all   Housing Stability: Unknown (7/16/2024)    Housing Stability Vital Sign     Unable to Pay for Housing in the Last Year: Patient declined      Family History   Problem Relation Name Age of Onset    No Known Problems Mother      No Known Problems Father      No Known Problems Sister      Cancer Sister Lin Resendez         Breast Cancer    No Known Problems Brother      Cancer Brother " Eusebio Bellard         Complete remission    No Known Problems Other            Objective:     Physical Exam  Vitals and nursing note reviewed.   Constitutional:       Appearance: Normal appearance. He is normal weight.   HENT:      Head: Normocephalic.      Nose: Nose normal.      Mouth/Throat:      Mouth: Mucous membranes are moist.      Pharynx: Oropharynx is clear.   Eyes:      Extraocular Movements: Extraocular movements intact.   Cardiovascular:      Rate and Rhythm: Normal rate and regular rhythm.   Pulmonary:      Effort: Pulmonary effort is normal.      Breath sounds: Normal breath sounds.   Musculoskeletal:         General: Normal range of motion.   Skin:     General: Skin is warm and dry.   Neurological:      General: No focal deficit present.      Mental Status: He is alert and oriented to person, place, and time. Mental status is at baseline.   Psychiatric:         Mood and Affect: Mood normal.       Current Outpatient Medications on File Prior to Visit   Medication Sig Dispense Refill    calcipotriene (DOVONOX) 0.005 % cream Apply topically 2 (two) times daily.      meloxicam (MOBIC) 15 MG tablet Take 1 tablet (15 mg total) by mouth once daily. With food as needed for pain 30 tablet 5    sildenafiL (VIAGRA) 100 MG tablet Take 1 tablet (100 mg total) by mouth daily as needed for Erectile Dysfunction. 10 tablet 11    dextroamphetamine-amphetamine (ADDERALL) 10 mg Tab Take 2 tablets (20 mg total) by mouth once daily. 60 tablet 0     No current facility-administered medications on file prior to visit.     Health Maintenance   Topic Date Due    RSV Vaccine (Age 60+ and Pregnant patients) (1 - Risk 60-74 years 1-dose series) Never done    COVID-19 Vaccine (1 - 2024-25 season) Never done    Shingles Vaccine (1 of 2) 11/04/2025 (Originally 6/1/2013)    Pneumococcal Vaccines (Age 0-64) (1 of 2 - PCV) 11/04/2025 (Originally 6/1/1971)    Hemoglobin A1c (Diabetic Prevention Screening)  01/17/2027    Lipid Panel   01/17/2029    TETANUS VACCINE  04/03/2029    Colorectal Cancer Screening  12/02/2032    Hepatitis C Screening  Completed    HIV Screening  Completed    Influenza Vaccine  Addressed      Results for orders placed or performed in visit on 04/06/24   POCT Urinalysis, Dipstick, Automated, W/O Scope    Collection Time: 04/06/24 11:38 AM   Result Value Ref Range    POC Blood, Urine Negative Negative    POC Bilirubin, Urine Positive (A) Negative    POC Urobilinogen, Urine 8 (A) 0.3 - 2.2    POC Ketones, Urine Negative Negative    POC Protein, Urine Positive (A) Negative    POC Nitrates, Urine Negative Negative    POC Glucose, Urine Negative Negative    pH, UA 7     POC Specific Gravity, Urine 1.010 1.003 - 1.029    POC Leukocytes, Urine Negative Negative   POCT Influenza A/B Molecular    Collection Time: 04/06/24 11:59 AM   Result Value Ref Range    POC Molecular Influenza A Ag Negative Negative, Not Reported    POC Molecular Influenza B Ag Negative Negative, Not Reported     Acceptable Yes    POCT COVID-19 Rapid Screening    Collection Time: 04/06/24 11:59 AM   Result Value Ref Range    POC Rapid COVID Negative Negative     Acceptable Yes           Assessment & Plan:     Active Problem List with Overview Notes    Diagnosis Date Noted    Neck pain 12/12/2024    Hypertension 01/30/2024    Rosacea 01/03/2023    Colon cancer screening 09/13/2022    Attention deficit hyperactivity disorder (ADHD) 06/13/2022    Erectile dysfunction 06/13/2022    Hyperlipidemia 06/13/2022    Right foot pain 06/13/2022       1. Attention deficit hyperactivity disorder (ADHD), unspecified ADHD type  Assessment & Plan:  On adderall 20 MG DAILY. Doing well.     Starr reviewed. Controlled substance policy signed TODAY    We printed rx for November, December and January at visit 11/4/24. Called [facility]did not have rx at pharmacy. Advised patient to look for his rx.  Since his rx were printed, we cannot replaced them  "as previously mentioned at his lov.        Reminded patient this is a controlled medication and must be seen q3 months while on.  Patient is agreeable to plan and verbalized understanding    UDS with wellness labs in January. Waiting to do labs in January due to insurance issue. Last dose of adderall about 1 week ago.       Orders:  -     Drug Screen, Urine    2. Primary hypertension  Assessment & Plan:  Well controlled on current prescription. Refill sent in. Monitor bp at home. Reports compliance with prescriptions.  Keep appts with cards. Contact clinic for concerns. Patient is agreeable to plan and verbalized understanding    Orders:  -     amLODIPine (NORVASC) 5 MG tablet; Take 1 tablet (5 mg total) by mouth once daily.  Dispense: 90 tablet; Refill: 3    3. Mixed hyperlipidemia  Assessment & Plan:  On statin. Keep appts with cards. Refill sent in.    Lab Results   Component Value Date    CHOL 210 (H) 10/30/2023    CHOL 208 (H) 10/12/2022    CHOL 178 08/09/2021     Lab Results   Component Value Date    HDL 59 10/30/2023    HDL 60 10/12/2022    HDL 50 08/09/2021     No results found for: "LDLCALC"  Lab Results   Component Value Date    TRIG 224 (H) 10/30/2023    TRIG 130 10/12/2022    TRIG 173 (H) 08/09/2021       No results found for: "CHOLHDL"      Orders:  -     atorvastatin (LIPITOR) 20 MG tablet; Take 1 tablet (20 mg total) by mouth once daily.  Dispense: 90 tablet; Refill: 3    4. Rosacea  Assessment & Plan:  On topicals. Rx sent in. Keep appts with derm. Contact clinic for concerns.     Orders:  -     hydrocortisone 2.5 % cream; Apply topically 2 (two) times daily. To affected area as directed.  Dispense: 28 g; Refill: 5    5. Neck pain  Assessment & Plan:  No trauma or injury.  Defers imaging today. Change his pillow. Ok to continue with otc nsaids, heat, massage and muscle rubs.  Defers muscle relaxer. Monitor symptoms. Contact clinic for concerns. Patient is agreeable to plan and verbalized " understanding.            Follow up in about 3 months (around 3/12/2025).

## 2024-12-11 NOTE — ASSESSMENT & PLAN NOTE
On adderall 20 MG DAILY. Doing well.     Starr reviewed. Controlled substance policy signed TODAY    We printed rx for November, December and January at visit 11/4/24. Called [facility]did not have rx at pharmacy. Advised patient to look for his rx.  Since his rx were printed, we cannot replaced them as previously mentioned at his lov.        Reminded patient this is a controlled medication and must be seen q3 months while on.  Patient is agreeable to plan and verbalized understanding    UDS with wellness labs in January. Waiting to do labs in January due to insurance issue. Last dose of adderall about 1 week ago.

## 2024-12-12 ENCOUNTER — TELEPHONE (OUTPATIENT)
Dept: FAMILY MEDICINE | Facility: CLINIC | Age: 59
End: 2024-12-12

## 2024-12-12 ENCOUNTER — OFFICE VISIT (OUTPATIENT)
Dept: FAMILY MEDICINE | Facility: CLINIC | Age: 59
End: 2024-12-12

## 2024-12-12 VITALS
BODY MASS INDEX: 25.61 KG/M2 | RESPIRATION RATE: 16 BRPM | OXYGEN SATURATION: 98 % | HEART RATE: 79 BPM | TEMPERATURE: 98 F | SYSTOLIC BLOOD PRESSURE: 126 MMHG | WEIGHT: 169 LBS | DIASTOLIC BLOOD PRESSURE: 80 MMHG | HEIGHT: 68 IN

## 2024-12-12 DIAGNOSIS — F90.9 ATTENTION DEFICIT HYPERACTIVITY DISORDER (ADHD), UNSPECIFIED ADHD TYPE: Primary | ICD-10-CM

## 2024-12-12 DIAGNOSIS — E78.2 MIXED HYPERLIPIDEMIA: ICD-10-CM

## 2024-12-12 DIAGNOSIS — M54.2 NECK PAIN: ICD-10-CM

## 2024-12-12 DIAGNOSIS — I10 PRIMARY HYPERTENSION: ICD-10-CM

## 2024-12-12 DIAGNOSIS — L71.9 ROSACEA: ICD-10-CM

## 2024-12-12 PROBLEM — R05.9 COUGH: Status: RESOLVED | Noted: 2023-07-19 | Resolved: 2024-12-12

## 2024-12-12 PROCEDURE — 99214 OFFICE O/P EST MOD 30 MIN: CPT | Mod: ,,, | Performed by: FAMILY MEDICINE

## 2024-12-12 RX ORDER — AMLODIPINE BESYLATE 5 MG/1
5 TABLET ORAL DAILY
Qty: 90 TABLET | Refills: 3 | Status: SHIPPED | OUTPATIENT
Start: 2024-12-12 | End: 2025-12-12

## 2024-12-12 RX ORDER — ATORVASTATIN CALCIUM 20 MG/1
20 TABLET, FILM COATED ORAL DAILY
Qty: 90 TABLET | Refills: 3 | Status: SHIPPED | OUTPATIENT
Start: 2024-12-12 | End: 2025-12-12

## 2024-12-12 RX ORDER — HYDROCORTISONE 25 MG/G
CREAM TOPICAL 2 TIMES DAILY
Qty: 28 G | Refills: 5 | Status: SHIPPED | OUTPATIENT
Start: 2024-12-12

## 2024-12-12 NOTE — TELEPHONE ENCOUNTER
MENGI at check out patient was rude and argumentative towards remy. Patient refused to give urine sample for UDS, and also refused to sign narcotic policy agreement.

## 2024-12-12 NOTE — TELEPHONE ENCOUNTER
Patient wasn't given any further rx's for controlled substance. Patient will need to sign another controlled substance agreement before receiving any future rx's. Madelyn

## 2024-12-12 NOTE — ASSESSMENT & PLAN NOTE
"On statin. Keep appts with cards. Refill sent in.    Lab Results   Component Value Date    CHOL 210 (H) 10/30/2023    CHOL 208 (H) 10/12/2022    CHOL 178 08/09/2021     Lab Results   Component Value Date    HDL 59 10/30/2023    HDL 60 10/12/2022    HDL 50 08/09/2021     No results found for: "LDLCALC"  Lab Results   Component Value Date    TRIG 224 (H) 10/30/2023    TRIG 130 10/12/2022    TRIG 173 (H) 08/09/2021       No results found for: "CHOLHDL"    "

## 2024-12-12 NOTE — ASSESSMENT & PLAN NOTE
Well controlled on current prescription. Refill sent in. Monitor bp at home. Reports compliance with prescriptions.  Keep appts with cards. Contact clinic for concerns. Patient is agreeable to plan and verbalized understanding

## 2024-12-12 NOTE — ASSESSMENT & PLAN NOTE
No trauma or injury.  Defers imaging today. Change his pillow. Ok to continue with otc nsaids, heat, massage and muscle rubs.  Defers muscle relaxer. Monitor symptoms. Contact clinic for concerns. Patient is agreeable to plan and verbalized understanding.

## 2025-01-25 ENCOUNTER — OFFICE VISIT (OUTPATIENT)
Dept: FAMILY MEDICINE | Facility: CLINIC | Age: 60
End: 2025-01-25
Payer: COMMERCIAL

## 2025-01-25 VITALS
TEMPERATURE: 98 F | OXYGEN SATURATION: 97 % | HEIGHT: 68 IN | BODY MASS INDEX: 26.63 KG/M2 | SYSTOLIC BLOOD PRESSURE: 126 MMHG | RESPIRATION RATE: 16 BRPM | DIASTOLIC BLOOD PRESSURE: 78 MMHG | HEART RATE: 79 BPM | WEIGHT: 175.69 LBS

## 2025-01-25 DIAGNOSIS — Z00.00 WELLNESS EXAMINATION: Primary | ICD-10-CM

## 2025-01-25 DIAGNOSIS — Z12.11 COLON CANCER SCREENING: ICD-10-CM

## 2025-01-25 DIAGNOSIS — E78.2 MIXED HYPERLIPIDEMIA: ICD-10-CM

## 2025-01-25 DIAGNOSIS — N52.9 ERECTILE DYSFUNCTION, UNSPECIFIED ERECTILE DYSFUNCTION TYPE: ICD-10-CM

## 2025-01-25 DIAGNOSIS — F90.9 ATTENTION DEFICIT HYPERACTIVITY DISORDER (ADHD), UNSPECIFIED ADHD TYPE: ICD-10-CM

## 2025-01-25 DIAGNOSIS — L25.9 CONTACT DERMATITIS, UNSPECIFIED CONTACT DERMATITIS TYPE, UNSPECIFIED TRIGGER: ICD-10-CM

## 2025-01-25 DIAGNOSIS — I10 PRIMARY HYPERTENSION: ICD-10-CM

## 2025-01-25 PROBLEM — M79.671 RIGHT FOOT PAIN: Status: RESOLVED | Noted: 2022-06-13 | Resolved: 2025-01-25

## 2025-01-25 PROBLEM — M54.2 NECK PAIN: Status: RESOLVED | Noted: 2024-12-12 | Resolved: 2025-01-25

## 2025-01-25 PROCEDURE — 3074F SYST BP LT 130 MM HG: CPT | Mod: CPTII,,, | Performed by: FAMILY MEDICINE

## 2025-01-25 PROCEDURE — 1159F MED LIST DOCD IN RCRD: CPT | Mod: CPTII,,, | Performed by: FAMILY MEDICINE

## 2025-01-25 PROCEDURE — 3008F BODY MASS INDEX DOCD: CPT | Mod: CPTII,,, | Performed by: FAMILY MEDICINE

## 2025-01-25 PROCEDURE — 1160F RVW MEDS BY RX/DR IN RCRD: CPT | Mod: CPTII,,, | Performed by: FAMILY MEDICINE

## 2025-01-25 PROCEDURE — 3078F DIAST BP <80 MM HG: CPT | Mod: CPTII,,, | Performed by: FAMILY MEDICINE

## 2025-01-25 PROCEDURE — 99396 PREV VISIT EST AGE 40-64: CPT | Mod: 25,,, | Performed by: FAMILY MEDICINE

## 2025-01-25 RX ORDER — DEXTROAMPHETAMINE SACCHARATE, AMPHETAMINE ASPARTATE, DEXTROAMPHETAMINE SULFATE AND AMPHETAMINE SULFATE 7.5; 7.5; 7.5; 7.5 MG/1; MG/1; MG/1; MG/1
30 TABLET ORAL EVERY MORNING
Qty: 30 TABLET | Refills: 0 | Status: SHIPPED | OUTPATIENT
Start: 2025-01-25 | End: 2025-02-24

## 2025-01-25 RX ORDER — DEXTROAMPHETAMINE SACCHARATE, AMPHETAMINE ASPARTATE, DEXTROAMPHETAMINE SULFATE AND AMPHETAMINE SULFATE 7.5; 7.5; 7.5; 7.5 MG/1; MG/1; MG/1; MG/1
30 TABLET ORAL EVERY MORNING
Qty: 30 TABLET | Refills: 0 | Status: SHIPPED | OUTPATIENT
Start: 2025-03-26 | End: 2025-04-25

## 2025-01-25 RX ORDER — DEXTROAMPHETAMINE SACCHARATE, AMPHETAMINE ASPARTATE, DEXTROAMPHETAMINE SULFATE AND AMPHETAMINE SULFATE 7.5; 7.5; 7.5; 7.5 MG/1; MG/1; MG/1; MG/1
30 TABLET ORAL EVERY MORNING
Qty: 30 TABLET | Refills: 0 | Status: SHIPPED | OUTPATIENT
Start: 2025-02-24 | End: 2025-01-25 | Stop reason: SDUPTHER

## 2025-01-25 RX ORDER — DEXTROAMPHETAMINE SACCHARATE, AMPHETAMINE ASPARTATE, DEXTROAMPHETAMINE SULFATE AND AMPHETAMINE SULFATE 7.5; 7.5; 7.5; 7.5 MG/1; MG/1; MG/1; MG/1
30 TABLET ORAL EVERY MORNING
Qty: 30 TABLET | Refills: 0 | Status: SHIPPED | OUTPATIENT
Start: 2025-02-24 | End: 2025-03-26

## 2025-01-25 RX ORDER — DEXTROAMPHETAMINE SACCHARATE, AMPHETAMINE ASPARTATE, DEXTROAMPHETAMINE SULFATE AND AMPHETAMINE SULFATE 7.5; 7.5; 7.5; 7.5 MG/1; MG/1; MG/1; MG/1
30 TABLET ORAL EVERY MORNING
Qty: 30 TABLET | Refills: 0 | Status: SHIPPED | OUTPATIENT
Start: 2025-01-25 | End: 2025-01-25 | Stop reason: SDUPTHER

## 2025-01-25 RX ORDER — METHYLPREDNISOLONE 4 MG/1
TABLET ORAL
Qty: 21 EACH | Refills: 0 | Status: SHIPPED | OUTPATIENT
Start: 2025-01-25

## 2025-01-25 NOTE — ASSESSMENT & PLAN NOTE
General skin care instructions discussed.  Has rx creams at home he will use.  Will send in medrol dose pack. Take as directed. Avoid neoprene fabrics.  Contact clinic for concerns. Patient is agreeable to plan and verbalized understanding

## 2025-01-25 NOTE — ASSESSMENT & PLAN NOTE
Well controlled on current prescription. Monitor bp at home. Reports compliance with prescriptions.  Keep appts with cards. Contact clinic for concerns. Patient is agreeable to plan and verbalized understanding

## 2025-01-25 NOTE — PROGRESS NOTES
Subjective:        Patient ID: Ankur Alexander is a 59 y.o. male.    Chief Complaint: Annual Exam (Wellness /Patient reports rash on feet and ankles for 2 months. Tried OTC creams and lotions helped temporarily )      Patient presents to the clinic for his wellness visit in October. He is due for labs.     C/o rash to ankles and feet after wearing new pair of neoprene boots. Returned them. Rash went away.   Went bought new pair or boots. Happened again. Wore again and got worse.  Returned those boots.  Used cortisone cream. Anti itch lotion and dr. Senior's.      He was evaluated by Danny Garvin 1/2022 and diagnosis of ADHD was confirmed.  He is currently on Adderall 20 mg once daily. He does not take his medication on the weekends usually.  Picked up rx for nov, dec and jan after visit 11/4/24. Confirmed [facility] does not have his paper rx. Advised pt to look at home to find his printed rxs for December and January.  Reminded patient we cannot replace rx since they were printed for him at 11/2024 visit. Has had trouble with pharmacy having dose in stock. Lost dose of adderall was about 1 weeks ago.    He feels as though the medication is helping him focus and would like to continue on his current dose.  He has 2 sons who have ADHD and are on medication.       He has erectile dysfunction and is currently on Viagra as needed. Levitra was too expensive.       Has rosacea. Seeing derm.  Using topicals.       He has right foot pain.  He states he has seen someone in the past for this and is on Mobic 15 mg as needed.       He has hyperlipidemia and is on atorvastatin. Has htn. On norvasc.   His cardiologist is Dr. Esteban.      He had a colonoscopy 11/2022 with dr. Damon. With repeat due in 5 years.       He is not allergic to any medications. He does not smoke.                  Review of Systems   HENT: Negative.     Eyes: Negative.    Respiratory: Negative.     Cardiovascular: Negative.    Gastrointestinal:  "Negative.    Endocrine: Negative.    Genitourinary: Negative.    Musculoskeletal: Negative.    Skin:  Positive for rash.   Allergic/Immunologic: Negative.    Neurological: Negative.    Hematological: Negative.    Psychiatric/Behavioral: Negative.     All other systems reviewed and are negative.        Review of patient's allergies indicates:   Allergen Reactions    Chloroprene polymer      Neoprene- rash      Vitals:    01/25/25 0954   BP: 126/78   BP Location: Left arm   Patient Position: Sitting   Pulse: 79   Resp: 16   Temp: 98.2 °F (36.8 °C)   TempSrc: Temporal   SpO2: 97%   Weight: 79.7 kg (175 lb 11.2 oz)   Height: 5' 8" (1.727 m)      Social History     Socioeconomic History    Marital status:    Tobacco Use    Smoking status: Never     Passive exposure: Never    Smokeless tobacco: Never   Substance and Sexual Activity    Alcohol use: Yes     Alcohol/week: 3.0 standard drinks of alcohol     Types: 3 Drinks containing 0.5 oz of alcohol per week     Comment: Social occasions only    Drug use: Never    Sexual activity: Yes     Partners: Female     Social Drivers of Health     Financial Resource Strain: Patient Declined (7/16/2024)    Overall Financial Resource Strain (CARDIA)     Difficulty of Paying Living Expenses: Patient declined   Food Insecurity: Patient Declined (7/16/2024)    Hunger Vital Sign     Worried About Running Out of Food in the Last Year: Patient declined     Ran Out of Food in the Last Year: Patient declined   Physical Activity: Sufficiently Active (7/16/2024)    Exercise Vital Sign     Days of Exercise per Week: 3 days     Minutes of Exercise per Session: 60 min   Stress: No Stress Concern Present (7/16/2024)    [employer] - Occupational Stress Questionnaire     Feeling of Stress : Not at all   Housing Stability: Unknown (7/16/2024)    Housing Stability Vital Sign     Unable to Pay for Housing in the Last Year: Patient declined      Family History "   Problem Relation Name Age of Onset    No Known Problems Mother      No Known Problems Father      No Known Problems Sister      Cancer Sister Lin Resendez         Breast Cancer    No Known Problems Brother      Cancer Brother Eusebio Alexander         Complete remission    No Known Problems Other            Objective:     Physical Exam  Vitals and nursing note reviewed.   Constitutional:       Appearance: Normal appearance. He is normal weight.   HENT:      Head: Normocephalic.      Nose: Nose normal.      Mouth/Throat:      Mouth: Mucous membranes are moist.      Pharynx: Oropharynx is clear.   Eyes:      Extraocular Movements: Extraocular movements intact.   Cardiovascular:      Rate and Rhythm: Normal rate and regular rhythm.   Pulmonary:      Effort: Pulmonary effort is normal.      Breath sounds: Normal breath sounds.   Musculoskeletal:         General: Normal range of motion.   Skin:     General: Skin is warm and dry.      Findings: Rash present.             Comments: Red dry scaly rash to bilateral feet/ankles. No drainage. No swelling.    Neurological:      General: No focal deficit present.      Mental Status: He is alert and oriented to person, place, and time. Mental status is at baseline.   Psychiatric:         Mood and Affect: Mood normal.       Current Outpatient Medications on File Prior to Visit   Medication Sig Dispense Refill    amLODIPine (NORVASC) 5 MG tablet Take 1 tablet (5 mg total) by mouth once daily. 90 tablet 3    atorvastatin (LIPITOR) 20 MG tablet Take 1 tablet (20 mg total) by mouth once daily. 90 tablet 3    calcipotriene (DOVONOX) 0.005 % cream Apply topically 2 (two) times daily.      hydrocortisone 2.5 % cream Apply topically 2 (two) times daily. To affected area as directed. 28 g 5    meloxicam (MOBIC) 15 MG tablet Take 1 tablet (15 mg total) by mouth once daily. With food as needed for pain 30 tablet 5    sildenafiL (VIAGRA) 100 MG tablet Take 1 tablet (100 mg total) by mouth  daily as needed for Erectile Dysfunction. 10 tablet 11    [DISCONTINUED] dextroamphetamine-amphetamine (ADDERALL) 10 mg Tab Take 2 tablets (20 mg total) by mouth once daily. 60 tablet 0     No current facility-administered medications on file prior to visit.     Health Maintenance   Topic Date Due    COVID-19 Vaccine (1 - 2024-25 season) Never done    RSV Vaccine (Age 60+ and Pregnant patients) (1 - Risk 60-74 years 1-dose series) 01/25/2025 (Originally 6/1/2023)    Shingles Vaccine (1 of 2) 11/04/2025 (Originally 6/1/2013)    Pneumococcal Vaccines (Age 50+) (1 of 2 - PCV) 01/25/2026 (Originally 6/1/1982)    Hemoglobin A1c (Diabetic Prevention Screening)  01/17/2027    Lipid Panel  01/17/2029    TETANUS VACCINE  04/03/2029    Colorectal Cancer Screening  12/02/2032    Hepatitis C Screening  Completed    HIV Screening  Completed    Influenza Vaccine  Addressed      Results for orders placed or performed in visit on 04/06/24   POCT Urinalysis, Dipstick, Automated, W/O Scope    Collection Time: 04/06/24 11:38 AM   Result Value Ref Range    POC Blood, Urine Negative Negative    POC Bilirubin, Urine Positive (A) Negative    POC Urobilinogen, Urine 8 (A) 0.3 - 2.2    POC Ketones, Urine Negative Negative    POC Protein, Urine Positive (A) Negative    POC Nitrates, Urine Negative Negative    POC Glucose, Urine Negative Negative    pH, UA 7     POC Specific Gravity, Urine 1.010 1.003 - 1.029    POC Leukocytes, Urine Negative Negative   POCT Influenza A/B Molecular    Collection Time: 04/06/24 11:59 AM   Result Value Ref Range    POC Molecular Influenza A Ag Negative Negative, Not Reported    POC Molecular Influenza B Ag Negative Negative, Not Reported     Acceptable Yes    POCT COVID-19 Rapid Screening    Collection Time: 04/06/24 11:59 AM   Result Value Ref Range    POC Rapid COVID Negative Negative     Acceptable Yes           Assessment & Plan:     Active Problem List with Overview Notes     Diagnosis Date Noted    Contact dermatitis 01/25/2025    Hypertension 01/30/2024    Rosacea 01/03/2023    Colon cancer screening 09/13/2022    Wellness examination 06/13/2022    Attention deficit hyperactivity disorder (ADHD) 06/13/2022    Erectile dysfunction 06/13/2022    Hyperlipidemia 06/13/2022       1. Wellness examination  Assessment & Plan:  Fasting labs ordered. Will call with results when available  psa ordered  Prostate Specific Antigen (ng/mL)   Date Value   10/30/2023 1.85       Colonoscopy with dr. yuen 11/2022 with 5 year recall        2. Attention deficit hyperactivity disorder (ADHD), unspecified ADHD type  Assessment & Plan:  On adderall 20 MG DAILY. He is requesting to increase to 30mg daily    [facility] reviewed. Last fill 11/5/24.  Controlled substance policy signed TODAY    Prescriptions printed for jan, feb and march per patient request and given to him.  Reminded patient again that if printed prescriptions are lost, misplaced or stolen, they cannot be replaced.       Reminded patient this is a controlled medication and must be seen q3 months while on.  Patient is agreeable to plan and verbalized understanding    UDS with wellness labs now.  Waiting to do labs in January due to insurance issue/out of adderall since 11/2024 2/2 lost previously printed rxs. Informed patient UDS must be completed before next appt for refills. Patient is agreeable to plan and verbalized understanding.     Orders:  -     Discontinue: dextroamphetamine-amphetamine (ADDERALL) 30 mg Tab; Take 1 tablet (30 mg total) by mouth every morning.  Dispense: 30 tablet; Refill: 0  -     Discontinue: dextroamphetamine-amphetamine (ADDERALL) 30 mg Tab; Take 1 tablet (30 mg total) by mouth every morning.  Dispense: 30 tablet; Refill: 0  -     dextroamphetamine-amphetamine (ADDERALL) 30 mg Tab; Take 1 tablet (30 mg total) by mouth every morning.  Dispense: 30 tablet; Refill: 0  -     dextroamphetamine-amphetamine (ADDERALL)  "30 mg Tab; Take 1 tablet (30 mg total) by mouth every morning.  Dispense: 30 tablet; Refill: 0  -     dextroamphetamine-amphetamine (ADDERALL) 30 mg Tab; Take 1 tablet (30 mg total) by mouth every morning.  Dispense: 30 tablet; Refill: 0    3. Primary hypertension  Assessment & Plan:  Well controlled on current prescription. Monitor bp at home. Reports compliance with prescriptions.  Keep appts with cards. Contact clinic for concerns. Patient is agreeable to plan and verbalized understanding      4. Mixed hyperlipidemia  Assessment & Plan:  On statin. Keep appts with cards. Refill sent in.    Lab Results   Component Value Date    CHOL 210 (H) 10/30/2023    CHOL 208 (H) 10/12/2022    CHOL 178 08/09/2021     Lab Results   Component Value Date    HDL 59 10/30/2023    HDL 60 10/12/2022    HDL 50 08/09/2021     No results found for: "LDLCALC"  Lab Results   Component Value Date    TRIG 224 (H) 10/30/2023    TRIG 130 10/12/2022    TRIG 173 (H) 08/09/2021       No results found for: "CHOLHDL"        5. Erectile dysfunction, unspecified erectile dysfunction type  Assessment & Plan:  Doing well on viagra prn.       6. Colon cancer screening  Assessment & Plan:  Colonoscopy with dr. yuen 11/2022      7. Contact dermatitis, unspecified contact dermatitis type, unspecified trigger  Assessment & Plan:  General skin care instructions discussed.  Has rx creams at home he will use.  Will send in medrol dose pack. Take as directed. Avoid neoprene fabrics.  Contact clinic for concerns. Patient is agreeable to plan and verbalized understanding      Other orders  -     methylPREDNISolone (MEDROL DOSEPACK) 4 mg tablet; use as directed  Dispense: 21 each; Refill: 0         Separate complaint outside of wellness visit  CC: C/o rash to ankles and feet after wearing new pair of neoprene boots. Returned them. Rash went away.   Went bought new pair or boots. Happened again. Wore again and got worse.  Returned those boots.  Used " cortisone cream. Anti itch lotion and dr. Senior's.    ROS: negative other than above  PE: as above  A/P: General skin care instructions discussed.  Has rx creams at home he will use.  Will send in medrol dose pack. Take as directed. Avoid neoprene fabrics.  Contact clinic for concerns. Patient is agreeable to plan and verbalized understanding              Follow up in about 3 months (around 4/25/2025) for ADHD virtual visit.

## 2025-01-25 NOTE — ASSESSMENT & PLAN NOTE
Fasting labs ordered. Will call with results when available  psa ordered  Prostate Specific Antigen (ng/mL)   Date Value   10/30/2023 1.85       Colonoscopy with dr. yuen 11/2022 with 5 year recall

## 2025-01-25 NOTE — ASSESSMENT & PLAN NOTE
On adderall 20 MG DAILY. He is requesting to increase to 30mg daily    [facility] reviewed. Last fill 11/5/24.  Controlled substance policy signed TODAY    Prescriptions printed for jan, feb and march per patient request and given to him.  Reminded patient again that if printed prescriptions are lost, misplaced or stolen, they cannot be replaced.       Reminded patient this is a controlled medication and must be seen q3 months while on.  Patient is agreeable to plan and verbalized understanding    UDS with wellness labs now.  Waiting to do labs in January due to insurance issue/out of adderall since 11/2024 2/2 lost previously printed rxs. Informed patient UDS must be completed before next appt for refills. Patient is agreeable to plan and verbalized understanding.

## 2025-01-28 ENCOUNTER — TELEPHONE (OUTPATIENT)
Dept: FAMILY MEDICINE | Facility: CLINIC | Age: 60
End: 2025-01-28
Payer: COMMERCIAL

## 2025-01-28 DIAGNOSIS — L25.9 CONTACT DERMATITIS, UNSPECIFIED CONTACT DERMATITIS TYPE, UNSPECIFIED TRIGGER: Primary | ICD-10-CM

## 2025-01-28 RX ORDER — TRIAMCINOLONE ACETONIDE 1 MG/G
CREAM TOPICAL 2 TIMES DAILY
Qty: 80 G | Refills: 0 | Status: SHIPPED | OUTPATIENT
Start: 2025-01-28

## 2025-01-28 NOTE — TELEPHONE ENCOUNTER
----- Message from Isaac Joiner sent at 1/28/2025  3:47 PM CST -----  .Type:  Patient Returning Call    Who Called:PT  Who Left Message for Patient:PT  Does the patient know what this is regarding?:Topical cream  Would the patient rather a call back or a response via [facility]?   Best Call Back Number:[phone]   Additional Information: Pt stated he was suppose to receive topical cream as well as oral medication from pharmacy. Pt only received oral prescription no topical cream for rash.   Pt is asking that his prescription cream be sent to pharmacy again

## 2025-01-28 NOTE — TELEPHONE ENCOUNTER
I did not send in topical cream because patient said he had some at home from previous.  I can send in rx though.     I have signed for the following orders AND/OR meds.  Please call the patient and ask the patient to schedule the testing AND/OR inform about any medications that were sent.        Medications Ordered This Encounter   Medications    triamcinolone acetonide 0.1% (KENALOG) 0.1 % cream     Sig: Apply topically 2 (two) times daily.     Dispense:  80 g     Refill:  0

## 2025-04-04 ENCOUNTER — TELEPHONE (OUTPATIENT)
Dept: PHARMACY | Facility: CLINIC | Age: 60
End: 2025-04-04
Payer: COMMERCIAL

## 2025-04-04 NOTE — TELEPHONE ENCOUNTER
[facility]/Population Health Chart Review & Patient Outreach Details For Medication Adherence Project    Reason for Outreach Encounter: 3rd Party payor non-compliance report ([employer], [employer], [employer], etc)  2.  Patient Outreach Method: Reviewed patient chart   3.   Medication in question:    Hyperlipidemia Medications              atorvastatin (LIPITOR) 20 MG tablet Take 1 tablet (20 mg total) by mouth once daily.                  LF 90 ds 2/6/25    4.  Reviewed and or Updates Made To: Patient Chart  5. Outreach Outcomes and/or actions taken: Patient filled medication and is on track to be adherent  Additional Notes:

## 2025-04-24 ENCOUNTER — LAB VISIT (OUTPATIENT)
Dept: LAB | Facility: HOSPITAL | Age: 60
End: 2025-04-24
Attending: FAMILY MEDICINE
Payer: COMMERCIAL

## 2025-04-24 ENCOUNTER — RESULTS FOLLOW-UP (OUTPATIENT)
Dept: FAMILY MEDICINE | Facility: CLINIC | Age: 60
End: 2025-04-24

## 2025-04-24 DIAGNOSIS — F90.9 ATTENTION DEFICIT HYPERACTIVITY DISORDER (ADHD), UNSPECIFIED ADHD TYPE: ICD-10-CM

## 2025-04-24 DIAGNOSIS — Z12.5 PROSTATE CANCER SCREENING: ICD-10-CM

## 2025-04-24 DIAGNOSIS — I10 PRIMARY HYPERTENSION: ICD-10-CM

## 2025-04-24 DIAGNOSIS — E78.2 MIXED HYPERLIPIDEMIA: ICD-10-CM

## 2025-04-24 DIAGNOSIS — Z00.00 WELLNESS EXAMINATION: ICD-10-CM

## 2025-04-24 LAB
ACCEPTIBLE SP GR UR QL: >=1.03 (ref 1–1.03)
ALBUMIN SERPL-MCNC: 4.3 G/DL (ref 3.4–4.8)
ALBUMIN/GLOB SERPL: 1.5 RATIO (ref 1.1–2)
ALP SERPL-CCNC: 112 UNIT/L (ref 40–150)
ALT SERPL-CCNC: 32 UNIT/L (ref 0–55)
AMPHET UR QL SCN: POSITIVE
ANION GAP SERPL CALC-SCNC: 8 MEQ/L
AST SERPL-CCNC: 28 UNIT/L (ref 11–45)
BARBITURATE SCN PRESENT UR: NEGATIVE
BASOPHILS # BLD AUTO: 0.02 X10(3)/MCL
BASOPHILS NFR BLD AUTO: 0.5 %
BENZODIAZ UR QL SCN: NEGATIVE
BILIRUB SERPL-MCNC: 0.9 MG/DL
BUN SERPL-MCNC: 14.5 MG/DL (ref 8.4–25.7)
CALCIUM SERPL-MCNC: 9.5 MG/DL (ref 8.8–10)
CANNABINOIDS UR QL SCN: NEGATIVE
CHLORIDE SERPL-SCNC: 109 MMOL/L (ref 98–107)
CHOLEST SERPL-MCNC: 152 MG/DL
CHOLEST/HDLC SERPL: 2 {RATIO} (ref 0–5)
CO2 SERPL-SCNC: 27 MMOL/L (ref 23–31)
COCAINE UR QL SCN: NEGATIVE
CREAT SERPL-MCNC: 1.13 MG/DL (ref 0.72–1.25)
CREAT/UREA NIT SERPL: 13
EOSINOPHIL # BLD AUTO: 0.07 X10(3)/MCL (ref 0–0.9)
EOSINOPHIL NFR BLD AUTO: 1.6 %
ERYTHROCYTE [DISTWIDTH] IN BLOOD BY AUTOMATED COUNT: 13.2 % (ref 11.5–17)
FENTANYL UR QL SCN: NEGATIVE
GFR SERPLBLD CREATININE-BSD FMLA CKD-EPI: >60 ML/MIN/1.73/M2
GLOBULIN SER-MCNC: 2.9 GM/DL (ref 2.4–3.5)
GLUCOSE SERPL-MCNC: 110 MG/DL (ref 82–115)
HCT VFR BLD AUTO: 46.9 % (ref 42–52)
HDLC SERPL-MCNC: 73 MG/DL (ref 35–60)
HGB BLD-MCNC: 15.5 G/DL (ref 14–18)
IMM GRANULOCYTES # BLD AUTO: 0.01 X10(3)/MCL (ref 0–0.04)
IMM GRANULOCYTES NFR BLD AUTO: 0.2 %
LDLC SERPL CALC-MCNC: 63 MG/DL (ref 50–140)
LYMPHOCYTES # BLD AUTO: 1.38 X10(3)/MCL (ref 0.6–4.6)
LYMPHOCYTES NFR BLD AUTO: 31.3 %
MCH RBC QN AUTO: 30.1 PG (ref 27–31)
MCHC RBC AUTO-ENTMCNC: 33 G/DL (ref 33–36)
MCV RBC AUTO: 91.1 FL (ref 80–94)
MDMA UR QL SCN: NEGATIVE
MONOCYTES # BLD AUTO: 0.39 X10(3)/MCL (ref 0.1–1.3)
MONOCYTES NFR BLD AUTO: 8.8 %
NEUTROPHILS # BLD AUTO: 2.54 X10(3)/MCL (ref 2.1–9.2)
NEUTROPHILS NFR BLD AUTO: 57.6 %
NRBC BLD AUTO-RTO: 0 %
OPIATES UR QL SCN: NEGATIVE
PCP UR QL: NEGATIVE
PH UR: 5.5 [PH] (ref 3–11)
PLATELET # BLD AUTO: 213 X10(3)/MCL (ref 130–400)
PMV BLD AUTO: 9.9 FL (ref 7.4–10.4)
POTASSIUM SERPL-SCNC: 4 MMOL/L (ref 3.5–5.1)
PROT SERPL-MCNC: 7.2 GM/DL (ref 5.8–7.6)
PSA SERPL-MCNC: 2.43 NG/ML
RBC # BLD AUTO: 5.15 X10(6)/MCL (ref 4.7–6.1)
SODIUM SERPL-SCNC: 144 MMOL/L (ref 136–145)
TRIGL SERPL-MCNC: 78 MG/DL (ref 34–140)
TSH SERPL-ACNC: 1.32 UIU/ML (ref 0.35–4.94)
VLDLC SERPL CALC-MCNC: 16 MG/DL
WBC # BLD AUTO: 4.41 X10(3)/MCL (ref 4.5–11.5)

## 2025-04-24 PROCEDURE — 80053 COMPREHEN METABOLIC PANEL: CPT

## 2025-04-24 PROCEDURE — 84153 ASSAY OF PSA TOTAL: CPT

## 2025-04-24 PROCEDURE — 85025 COMPLETE CBC W/AUTO DIFF WBC: CPT

## 2025-04-24 PROCEDURE — 80061 LIPID PANEL: CPT

## 2025-04-24 PROCEDURE — 80307 DRUG TEST PRSMV CHEM ANLYZR: CPT

## 2025-04-24 PROCEDURE — 84443 ASSAY THYROID STIM HORMONE: CPT

## 2025-04-24 PROCEDURE — 36415 COLL VENOUS BLD VENIPUNCTURE: CPT

## 2025-04-25 ENCOUNTER — OFFICE VISIT (OUTPATIENT)
Dept: FAMILY MEDICINE | Facility: CLINIC | Age: 60
End: 2025-04-25
Payer: COMMERCIAL

## 2025-04-25 DIAGNOSIS — F90.9 ATTENTION DEFICIT HYPERACTIVITY DISORDER (ADHD), UNSPECIFIED ADHD TYPE: Primary | ICD-10-CM

## 2025-04-25 RX ORDER — DEXTROAMPHETAMINE SACCHARATE, AMPHETAMINE ASPARTATE, DEXTROAMPHETAMINE SULFATE AND AMPHETAMINE SULFATE 7.5; 7.5; 7.5; 7.5 MG/1; MG/1; MG/1; MG/1
30 TABLET ORAL EVERY MORNING
Qty: 30 TABLET | Refills: 0 | Status: SHIPPED | OUTPATIENT
Start: 2025-04-27 | End: 2025-05-27

## 2025-04-25 RX ORDER — DEXTROAMPHETAMINE SACCHARATE, AMPHETAMINE ASPARTATE, DEXTROAMPHETAMINE SULFATE AND AMPHETAMINE SULFATE 7.5; 7.5; 7.5; 7.5 MG/1; MG/1; MG/1; MG/1
TABLET ORAL
COMMUNITY
End: 2025-04-25

## 2025-04-25 RX ORDER — DEXTROAMPHETAMINE SACCHARATE, AMPHETAMINE ASPARTATE, DEXTROAMPHETAMINE SULFATE AND AMPHETAMINE SULFATE 7.5; 7.5; 7.5; 7.5 MG/1; MG/1; MG/1; MG/1
30 TABLET ORAL DAILY
Qty: 30 TABLET | Refills: 0 | Status: SHIPPED | OUTPATIENT
Start: 2025-06-26 | End: 2025-07-26

## 2025-04-25 RX ORDER — DEXTROAMPHETAMINE SACCHARATE, AMPHETAMINE ASPARTATE, DEXTROAMPHETAMINE SULFATE AND AMPHETAMINE SULFATE 7.5; 7.5; 7.5; 7.5 MG/1; MG/1; MG/1; MG/1
30 TABLET ORAL DAILY
Qty: 30 TABLET | Refills: 0 | Status: SHIPPED | OUTPATIENT
Start: 2025-05-27 | End: 2025-06-26

## 2025-04-25 NOTE — ASSESSMENT & PLAN NOTE
On adderall 30 MG daily.     [facility] reviewed.  Controlled substance policy signed 1/2025    Prescriptions printed for April, may and june per patient request. He will come to office today to pick them up. They are in an envelope at the front office.   Reminded patient again that if printed prescriptions are lost, misplaced or stolen, they cannot be replaced.       Reminded patient this is a controlled medication and must be seen q3 months while on.  Patient is agreeable to plan and verbalized understanding    UDS 4/2025 appropriately positive for amphetamines.

## 2025-04-25 NOTE — PROGRESS NOTES
Subjective:        Patient ID: Ankur Alexander is a 59 y.o. male.    Chief Complaint: Follow-up (ADHD follow up )      Patient presents to the clinic unaccompanied via telemed for adhd follow up.  He is due for his wellness visit in January. He did labs prior to his appt and it was reviewed with patient at time of appt today     He was evaluated by Danny Garvin 1/2022 and diagnosis of ADHD was confirmed.  He is currently on Adderall 30 mg once daily. He does not take his medication on the weekends usually.  He feels as though the medication is helping him focus and would like to continue on his current dose.  Needs refills.  He has 2 sons who have ADHD and are on medication.       C/o left arm pain.  Used topical otc cream and helps.     He has erectile dysfunction and is currently on Viagra as needed. Levitra was too expensive.       Has rosacea. Seeing derm.  Using topicals.       He has right foot pain.  He states he has seen someone in the past for this and is on Mobic 15 mg as needed.       He has hyperlipidemia and is on atorvastatin. Has htn. On norvasc.   His cardiologist is Dr. Esteban. No refills requested.      He had a colonoscopy 11/2022 with dr. Damon. With repeat due in 5 years.       He is not allergic to any medications. He does not smoke.              The patient location is: home  The chief complaint leading to consultation is: adhd follow up/med refills    Visit type: audiovisual    Face to Face time with patient: 15 min  20 minutes of total time spent on the encounter, which includes face to face time and non-face to face time preparing to see the patient (eg, review of tests), Obtaining and/or reviewing separately obtained history, Documenting clinical information in the electronic or other health record, Independently interpreting results (not separately reported) and communicating results to the patient/family/caregiver, or Care coordination (not separately reported).         Each  patient to whom he or she provides medical services by telemedicine is:  (1) informed of the relationship between the physician and patient and the respective role of any other health care provider with respect to management of the patient; and (2) notified that he or she may decline to receive medical services by telemedicine and may withdraw from such care at any time.    Notes:                   Review of Systems   Constitutional:  Negative for activity change and unexpected weight change.   HENT:  Negative for hearing loss, rhinorrhea and trouble swallowing.    Eyes:  Negative for discharge and visual disturbance.   Respiratory:  Negative for chest tightness and wheezing.    Cardiovascular:  Negative for chest pain and palpitations.   Gastrointestinal:  Negative for blood in stool, constipation, diarrhea and vomiting.   Endocrine: Negative for polydipsia and polyuria.   Genitourinary:  Negative for difficulty urinating, hematuria and urgency.   Musculoskeletal:  Negative for arthralgias, joint swelling and neck pain.   Neurological:  Negative for weakness and headaches.   Psychiatric/Behavioral:  Negative for confusion and dysphoric mood.          Review of patient's allergies indicates:   Allergen Reactions    Chloroprene polymer      Neoprene- rash      There were no vitals filed for this visit.   Social History     Socioeconomic History    Marital status:    Tobacco Use    Smoking status: Never     Passive exposure: Never    Smokeless tobacco: Never   Substance and Sexual Activity    Alcohol use: Yes     Alcohol/week: 3.0 standard drinks of alcohol     Types: 3 Drinks containing 0.5 oz of alcohol per week     Comment: Social occasions only    Drug use: Never    Sexual activity: Yes     Partners: Female     Social Drivers of Health     Financial Resource Strain: Patient Declined (4/25/2025)    Overall Financial Resource Strain (CARDIA)     Difficulty of Paying Living Expenses: Patient declined   Food  Insecurity: No Food Insecurity (4/25/2025)    Hunger Vital Sign     Worried About Running Out of Food in the Last Year: Never true     Ran Out of Food in the Last Year: Never true   Transportation Needs: No Transportation Needs (4/25/2025)    PRAPARE - Transportation     Lack of Transportation (Medical): No     Lack of Transportation (Non-Medical): No   Physical Activity: Insufficiently Active (4/25/2025)    Exercise Vital Sign     Days of Exercise per Week: 3 days     Minutes of Exercise per Session: 30 min   Stress: No Stress Concern Present (4/25/2025)    Citizen of [place] of [employer] - Occupational Stress Questionnaire     Feeling of Stress : Not at all   Housing Stability: Low Risk  (4/25/2025)    Housing Stability Vital Sign     Unable to Pay for Housing in the Last Year: No     Number of Times Moved in the Last Year: 0     Homeless in the Last Year: No      Family History   Problem Relation Name Age of Onset    No Known Problems Mother      No Known Problems Father      No Known Problems Sister      Cancer Sister Lin Resendez         Breast Cancer    No Known Problems Brother      Cancer Brother Eusebio Alexander         Complete remission    No Known Problems Other            Objective:     Physical Exam  Vitals and nursing note reviewed.   Constitutional:       Appearance: Normal appearance. He is normal weight.   HENT:      Head: Normocephalic.   Eyes:      Extraocular Movements: Extraocular movements intact.   Pulmonary:      Effort: Pulmonary effort is normal.   Neurological:      General: No focal deficit present.      Mental Status: He is alert and oriented to person, place, and time. Mental status is at baseline.   Psychiatric:         Mood and Affect: Mood normal.       Medications Ordered Prior to Encounter[1]  Health Maintenance   Topic Date Due    COVID-19 Vaccine (1 - 2024-25 season) Never done    RSV Vaccine (Age 60+ and Pregnant patients) (1 - Risk 60-74 years 1-dose series)  04/25/2025 (Originally 6/1/2023)    Shingles Vaccine (1 of 2) 11/04/2025 (Originally 6/1/2013)    Pneumococcal Vaccines (Age 50+) (1 of 2 - PCV) 01/25/2026 (Originally 6/1/1982)    Hemoglobin A1c (Diabetic Prevention Screening)  01/17/2027    TETANUS VACCINE  04/03/2029    Lipid Panel  04/24/2030    Colorectal Cancer Screening  12/02/2032    Hepatitis C Screening  Completed    HIV Screening  Completed    Influenza Vaccine  Addressed      Results for orders placed or performed in visit on 04/24/25   Comprehensive Metabolic Panel    Collection Time: 04/24/25 10:48 AM   Result Value Ref Range    Sodium 144 136 - 145 mmol/L    Potassium 4.0 3.5 - 5.1 mmol/L    Chloride 109 (H) 98 - 107 mmol/L    CO2 27 23 - 31 mmol/L    Glucose 110 82 - 115 mg/dL    Blood Urea Nitrogen 14.5 8.4 - 25.7 mg/dL    Creatinine 1.13 0.72 - 1.25 mg/dL    Calcium 9.5 8.8 - 10.0 mg/dL    Protein Total 7.2 5.8 - 7.6 gm/dL    Albumin 4.3 3.4 - 4.8 g/dL    Globulin 2.9 2.4 - 3.5 gm/dL    Albumin/Globulin Ratio 1.5 1.1 - 2.0 ratio    Bilirubin Total 0.9 <=1.5 mg/dL     40 - 150 unit/L    ALT 32 0 - 55 unit/L    AST 28 11 - 45 unit/L    eGFR >60 mL/min/1.73/m2    Anion Gap 8.0 mEq/L    BUN/Creatinine Ratio 13    Lipid Panel    Collection Time: 04/24/25 10:48 AM   Result Value Ref Range    Cholesterol Total 152 <=200 mg/dL    HDL Cholesterol 73 (H) 35 - 60 mg/dL    Triglyceride 78 34 - 140 mg/dL    Cholesterol/HDL Ratio 2 0 - 5    Very Low Density Lipoprotein 16     LDL Cholesterol 63.00 50.00 - 140.00 mg/dL   TSH    Collection Time: 04/24/25 10:48 AM   Result Value Ref Range    TSH 1.319 0.350 - 4.940 uIU/mL   Drug Screen, Urine    Collection Time: 04/24/25 10:48 AM   Result Value Ref Range    Amphetamines, Urine Positive (A) Negative    Barbiturates, Urine Negative Negative    Benzodiazepine, Urine Negative Negative    Cannabinoids, Urine Negative Negative    Cocaine, Urine Negative Negative    Fentanyl, Urine Negative Negative    MDMA, Urine  Negative Negative    Opiates, Urine Negative Negative    Phencyclidine, Urine Negative Negative    pH, Urine 5.5 3.0 - 11.0    Specific Gravity, Urine Auto >=1.030 1.001 - 1.035   PSA, Screening    Collection Time: 04/24/25 10:48 AM   Result Value Ref Range    Prostate Specific Antigen 2.43 <=4.00 ng/mL   CBC with Differential    Collection Time: 04/24/25 10:48 AM   Result Value Ref Range    WBC 4.41 (L) 4.50 - 11.50 x10(3)/mcL    RBC 5.15 4.70 - 6.10 x10(6)/mcL    Hgb 15.5 14.0 - 18.0 g/dL    Hct 46.9 42.0 - 52.0 %    MCV 91.1 80.0 - 94.0 fL    MCH 30.1 27.0 - 31.0 pg    MCHC 33.0 33.0 - 36.0 g/dL    RDW 13.2 11.5 - 17.0 %    Platelet 213 130 - 400 x10(3)/mcL    MPV 9.9 7.4 - 10.4 fL    Neut % 57.6 %    Lymph % 31.3 %    Mono % 8.8 %    Eos % 1.6 %    Basophil % 0.5 %    Imm Grans % 0.2 %    Neut # 2.54 2.1 - 9.2 x10(3)/mcL    Lymph # 1.38 0.6 - 4.6 x10(3)/mcL    Mono # 0.39 0.1 - 1.3 x10(3)/mcL    Eos # 0.07 0 - 0.9 x10(3)/mcL    Baso # 0.02 <=0.2 x10(3)/mcL    Imm Gran # 0.01 0.00 - 0.04 x10(3)/mcL    NRBC% 0.0 %          Assessment & Plan:     Active Problem List with Overview Notes    Diagnosis Date Noted    Contact dermatitis 01/25/2025    Hypertension 01/30/2024    Rosacea 01/03/2023    Colon cancer screening 09/13/2022    Wellness examination 06/13/2022    Attention deficit hyperactivity disorder (ADHD) 06/13/2022    Erectile dysfunction 06/13/2022    Hyperlipidemia 06/13/2022       1. Attention deficit hyperactivity disorder (ADHD), unspecified ADHD type  Assessment & Plan:  On adderall 30 MG daily.     [facility] reviewed.  Controlled substance policy signed 1/2025    Prescriptions printed for April, may and june per patient request. He will come to office today to pick them up. They are in an envelope at the front office.   Reminded patient again that if printed prescriptions are lost, misplaced or stolen, they cannot be replaced.       Reminded patient this is a controlled medication and must be seen q3  months while on.  Patient is agreeable to plan and verbalized understanding    UDS 4/2025 appropriately positive for amphetamines.     Orders:  -     dextroamphetamine-amphetamine (ADDERALL) 30 mg Tab; Take 1 tablet (30 mg total) by mouth every morning.  Dispense: 30 tablet; Refill: 0  -     dextroamphetamine-amphetamine (ADDERALL) 30 mg Tab; Take 1 tablet (30 mg total) by mouth once daily.  Dispense: 30 tablet; Refill: 0  -     dextroamphetamine-amphetamine (ADDERALL) 30 mg Tab; Take 1 tablet (30 mg total) by mouth once daily.  Dispense: 30 tablet; Refill: 0         Follow up in about 3 months (around 7/25/2025) for ADHD virtual visit.          [1]   Current Outpatient Medications on File Prior to Visit   Medication Sig Dispense Refill    [DISCONTINUED] dextroamphetamine-amphetamine (ADDERALL) 30 mg Tab Take by mouth.      amLODIPine (NORVASC) 5 MG tablet Take 1 tablet (5 mg total) by mouth once daily. 90 tablet 3    atorvastatin (LIPITOR) 20 MG tablet Take 1 tablet (20 mg total) by mouth once daily. 90 tablet 3    calcipotriene (DOVONOX) 0.005 % cream Apply topically 2 (two) times daily.      hydrocortisone 2.5 % cream Apply topically 2 (two) times daily. To affected area as directed. 28 g 5    meloxicam (MOBIC) 15 MG tablet Take 1 tablet (15 mg total) by mouth once daily. With food as needed for pain 30 tablet 5    sildenafiL (VIAGRA) 100 MG tablet Take 1 tablet (100 mg total) by mouth daily as needed for Erectile Dysfunction. 10 tablet 11    triamcinolone acetonide 0.1% (KENALOG) 0.1 % cream Apply topically 2 (two) times daily. 80 g 0    [DISCONTINUED] dextroamphetamine-amphetamine (ADDERALL) 30 mg Tab Take 1 tablet (30 mg total) by mouth every morning. 30 tablet 0    [DISCONTINUED] methylPREDNISolone (MEDROL DOSEPACK) 4 mg tablet use as directed 21 each 0     No current facility-administered medications on file prior to visit.

## 2025-06-22 ENCOUNTER — TELEPHONE (OUTPATIENT)
Dept: PHARMACY | Facility: CLINIC | Age: 60
End: 2025-06-22
Payer: COMMERCIAL

## 2025-06-22 NOTE — TELEPHONE ENCOUNTER
[facility]Population [facility] Chart Review & Patient Outreach Details For Medication Adherence Project    Reason for Outreach Encounter: 3rd Party payor non-compliance report ([employer], [employer], C, etc)  2.  Patient Outreach Method: Reviewed Patient Chart  3.   Medication in question: atorvastatin   LAST FILLED: 5/28/25 for 90 day supply  Hyperlipidemia Medications              atorvastatin (LIPITOR) 20 MG tablet Take 1 tablet (20 mg total) by mouth once daily.               4.  Reviewed and or Updates Made To: Patient Chart  5. Outreach Outcomes and/or actions taken: Patient filled medication and is on track to be adherent

## 2025-07-14 ENCOUNTER — TELEPHONE (OUTPATIENT)
Dept: PHARMACY | Facility: CLINIC | Age: 60
End: 2025-07-14
Payer: COMMERCIAL

## 2025-07-14 NOTE — TELEPHONE ENCOUNTER
[facility]Population [facility] Chart Review & Patient Outreach Details For Medication Adherence Project    Reason for Outreach Encounter: 3rd Party payor non-compliance report ([employer], [employer], C, etc)  2.  Patient Outreach Method: Reviewed Patient Chart  3.   Medication in question: Atorvastatin 20mg   LAST FILLED: 5/28/25 for 90 day supply  Hyperlipidemia Medications              atorvastatin (LIPITOR) 20 MG tablet Take 1 tablet (20 mg total) by mouth once daily.               4.  Reviewed and or Updates Made To: Patient Chart  5. Outreach Outcomes and/or actions taken: Patient filled medication and is on track to be adherent

## 2025-07-25 ENCOUNTER — OFFICE VISIT (OUTPATIENT)
Dept: FAMILY MEDICINE | Facility: CLINIC | Age: 60
End: 2025-07-25
Payer: COMMERCIAL

## 2025-07-25 DIAGNOSIS — F90.9 ATTENTION DEFICIT HYPERACTIVITY DISORDER (ADHD), UNSPECIFIED ADHD TYPE: Primary | ICD-10-CM

## 2025-07-25 RX ORDER — DEXTROAMPHETAMINE SACCHARATE, AMPHETAMINE ASPARTATE, DEXTROAMPHETAMINE SULFATE AND AMPHETAMINE SULFATE 7.5; 7.5; 7.5; 7.5 MG/1; MG/1; MG/1; MG/1
30 TABLET ORAL DAILY
Qty: 30 TABLET | Refills: 0 | Status: SHIPPED | OUTPATIENT
Start: 2025-08-08 | End: 2025-07-25 | Stop reason: SDUPTHER

## 2025-07-25 RX ORDER — DEXTROAMPHETAMINE SACCHARATE, AMPHETAMINE ASPARTATE, DEXTROAMPHETAMINE SULFATE AND AMPHETAMINE SULFATE 7.5; 7.5; 7.5; 7.5 MG/1; MG/1; MG/1; MG/1
30 TABLET ORAL DAILY
Qty: 30 TABLET | Refills: 0 | Status: SHIPPED | OUTPATIENT
Start: 2025-10-07 | End: 2025-11-06

## 2025-07-25 RX ORDER — DEXTROAMPHETAMINE SACCHARATE, AMPHETAMINE ASPARTATE, DEXTROAMPHETAMINE SULFATE AND AMPHETAMINE SULFATE 7.5; 7.5; 7.5; 7.5 MG/1; MG/1; MG/1; MG/1
30 TABLET ORAL DAILY
Qty: 30 TABLET | Refills: 0 | Status: SHIPPED | OUTPATIENT
Start: 2025-09-07 | End: 2025-10-07

## 2025-07-25 RX ORDER — DEXTROAMPHETAMINE SACCHARATE, AMPHETAMINE ASPARTATE, DEXTROAMPHETAMINE SULFATE AND AMPHETAMINE SULFATE 7.5; 7.5; 7.5; 7.5 MG/1; MG/1; MG/1; MG/1
30 TABLET ORAL DAILY
Qty: 30 TABLET | Refills: 0 | Status: SHIPPED | OUTPATIENT
Start: 2025-08-08 | End: 2025-09-07

## 2025-07-25 NOTE — ASSESSMENT & PLAN NOTE
On adderall 30 MG daily.     [facility] reviewed.  Controlled substance policy signed 1/2025    Prescriptions printed for August, September, and october per patient request. He will come to office today to pick them up. They are in an envelope at the front office.   Reminded patient again that if printed prescriptions are lost, misplaced or stolen, they cannot be replaced.       Reminded patient this is a controlled medication and must be seen q3 months while on.  Patient is agreeable to plan and verbalized understanding    UDS 4/2025 appropriately positive for amphetamines.

## 2025-07-25 NOTE — PROGRESS NOTES
Subjective:        Patient ID: Ankur Alexander is a 60 y.o. male.    Chief Complaint: Follow-up (ADHD follow up )      Patient presents to the clinic unaccompanied via telemed for adhd follow up.  He is due for his wellness visit in January.      He was evaluated by Danny Garvin 1/2022 and diagnosis of ADHD was confirmed.  He is currently on Adderall 30 mg once daily. He does not take his medication on the weekends usually.  He feels as though the medication is helping him focus and would like to continue on his current dose.  Needs postdated refills.  He has 2 sons who have ADHD and are on medication.   Asking for printed rx as they have not always been available on time at his pharmacy. He will come to               He has erectile dysfunction and is currently on Viagra as needed. Levitra was too expensive.       Has rosacea. Seeing derm.  Using topicals.       He has right foot pain.  He states he has seen someone in the past for this and is on Mobic 15 mg as needed.       He has hyperlipidemia and is on atorvastatin. Has htn. On norvasc.   His cardiologist is Dr. Esteban. No refills requested.      He had a colonoscopy 11/2022 with dr. Damon. With repeat due in 5 years.       He is not allergic to any medications. He does not smoke.              The patient location is: home  The chief complaint leading to consultation is: adhd follow up/med refills    Visit type: audiovisual    Face to Face time with patient: 5 min  20 minutes of total time spent on the encounter, which includes face to face time and non-face to face time preparing to see the patient (eg, review of tests), Obtaining and/or reviewing separately obtained history, Documenting clinical information in the electronic or other health record, Independently interpreting results (not separately reported) and communicating results to the patient/family/caregiver, or Care coordination (not separately reported).         Each patient to whom  he or she provides medical services by telemedicine is:  (1) informed of the relationship between the physician and patient and the respective role of any other health care provider with respect to management of the patient; and (2) notified that he or she may decline to receive medical services by telemedicine and may withdraw from such care at any time.    Notes:                   Review of Systems   Constitutional:  Negative for activity change and unexpected weight change.   HENT:  Negative for hearing loss, rhinorrhea and trouble swallowing.    Eyes:  Negative for discharge and visual disturbance.   Respiratory:  Negative for chest tightness and wheezing.    Cardiovascular:  Negative for chest pain and palpitations.   Gastrointestinal:  Negative for blood in stool, constipation, diarrhea and vomiting.   Endocrine: Negative for polydipsia and polyuria.   Genitourinary:  Negative for difficulty urinating, hematuria and urgency.   Musculoskeletal:  Negative for arthralgias, joint swelling and neck pain.   Neurological:  Negative for weakness and headaches.   Psychiatric/Behavioral:  Negative for confusion and dysphoric mood.          Review of patient's allergies indicates:   Allergen Reactions    Chloroprene polymer      Neoprene- rash      There were no vitals filed for this visit.   Social History     Socioeconomic History    Marital status:    Tobacco Use    Smoking status: Never     Passive exposure: Never    Smokeless tobacco: Never   Substance and Sexual Activity    Alcohol use: Yes     Alcohol/week: 3.0 standard drinks of alcohol     Types: 3 Drinks containing 0.5 oz of alcohol per week     Comment: Social occasions only    Drug use: Never    Sexual activity: Yes     Partners: Female     Social Drivers of Health     Financial Resource Strain: Patient Declined (4/25/2025)    Overall Financial Resource Strain (CARDIA)     Difficulty of Paying Living Expenses: Patient declined   Food Insecurity: No  Food Insecurity (4/25/2025)    Hunger Vital Sign     Worried About Running Out of Food in the Last Year: Never true     Ran Out of Food in the Last Year: Never true   Transportation Needs: No Transportation Needs (4/25/2025)    PRAPARE - Transportation     Lack of Transportation (Medical): No     Lack of Transportation (Non-Medical): No   Physical Activity: Insufficiently Active (4/25/2025)    Exercise Vital Sign     Days of Exercise per Week: 3 days     Minutes of Exercise per Session: 30 min   Stress: No Stress Concern Present (4/25/2025)    [employer] - Occupational Stress Questionnaire     Feeling of Stress : Not at all   Housing Stability: Low Risk  (4/25/2025)    Housing Stability Vital Sign     Unable to Pay for Housing in the Last Year: No     Number of Times Moved in the Last Year: 0     Homeless in the Last Year: No      Family History   Problem Relation Name Age of Onset    No Known Problems Mother      No Known Problems Father      No Known Problems Sister      Cancer Sister Lin Resendez         Breast Cancer    No Known Problems Brother      Cancer Brother Eusebio Alexander         Complete remission    No Known Problems Other            Objective:     Physical Exam  Vitals and nursing note reviewed.   Constitutional:       Appearance: Normal appearance. He is normal weight.   HENT:      Head: Normocephalic.   Eyes:      Extraocular Movements: Extraocular movements intact.   Pulmonary:      Effort: Pulmonary effort is normal.   Neurological:      General: No focal deficit present.      Mental Status: He is alert and oriented to person, place, and time. Mental status is at baseline.   Psychiatric:         Mood and Affect: Mood normal.       Medications Ordered Prior to Encounter[1]  Health Maintenance   Topic Date Due    COVID-19 Vaccine (1 - 2024-25 season) Never done    RSV Vaccine (Age 60+ and Pregnant patients) (1 - Risk 60-74 years 1-dose series) 07/25/2025 (Originally  6/1/2023)    Shingles Vaccine (1 of 2) 11/04/2025 (Originally 6/1/2013)    Pneumococcal Vaccines (Age 50+) (1 of 2 - PCV) 01/25/2026 (Originally 6/1/1982)    Influenza Vaccine (1) 09/01/2025    Hemoglobin A1c (Diabetic Prevention Screening)  01/17/2027    TETANUS VACCINE  04/03/2029    Lipid Panel  04/24/2030    Colorectal Cancer Screening  12/02/2032    Hepatitis C Screening  Completed    HIV Screening  Completed      Results for orders placed or performed in visit on 04/24/25   Comprehensive Metabolic Panel    Collection Time: 04/24/25 10:48 AM   Result Value Ref Range    Sodium 144 136 - 145 mmol/L    Potassium 4.0 3.5 - 5.1 mmol/L    Chloride 109 (H) 98 - 107 mmol/L    CO2 27 23 - 31 mmol/L    Glucose 110 82 - 115 mg/dL    Blood Urea Nitrogen 14.5 8.4 - 25.7 mg/dL    Creatinine 1.13 0.72 - 1.25 mg/dL    Calcium 9.5 8.8 - 10.0 mg/dL    Protein Total 7.2 5.8 - 7.6 gm/dL    Albumin 4.3 3.4 - 4.8 g/dL    Globulin 2.9 2.4 - 3.5 gm/dL    Albumin/Globulin Ratio 1.5 1.1 - 2.0 ratio    Bilirubin Total 0.9 <=1.5 mg/dL     40 - 150 unit/L    ALT 32 0 - 55 unit/L    AST 28 11 - 45 unit/L    eGFR >60 mL/min/1.73/m2    Anion Gap 8.0 mEq/L    BUN/Creatinine Ratio 13    Lipid Panel    Collection Time: 04/24/25 10:48 AM   Result Value Ref Range    Cholesterol Total 152 <=200 mg/dL    HDL Cholesterol 73 (H) 35 - 60 mg/dL    Triglyceride 78 34 - 140 mg/dL    Cholesterol/HDL Ratio 2 0 - 5    Very Low Density Lipoprotein 16     LDL Cholesterol 63.00 50.00 - 140.00 mg/dL   TSH    Collection Time: 04/24/25 10:48 AM   Result Value Ref Range    TSH 1.319 0.350 - 4.940 uIU/mL   Drug Screen, Urine    Collection Time: 04/24/25 10:48 AM   Result Value Ref Range    Amphetamines, Urine Positive (A) Negative    Barbiturates, Urine Negative Negative    Benzodiazepine, Urine Negative Negative    Cannabinoids, Urine Negative Negative    Cocaine, Urine Negative Negative    Fentanyl, Urine Negative Negative    MDMA, Urine Negative Negative     Opiates, Urine Negative Negative    Phencyclidine, Urine Negative Negative    pH, Urine 5.5 3.0 - 11.0    Specific Gravity, Urine Auto >=1.030 1.001 - 1.035   PSA, Screening    Collection Time: 04/24/25 10:48 AM   Result Value Ref Range    Prostate Specific Antigen 2.43 <=4.00 ng/mL   CBC with Differential    Collection Time: 04/24/25 10:48 AM   Result Value Ref Range    WBC 4.41 (L) 4.50 - 11.50 x10(3)/mcL    RBC 5.15 4.70 - 6.10 x10(6)/mcL    Hgb 15.5 14.0 - 18.0 g/dL    Hct 46.9 42.0 - 52.0 %    MCV 91.1 80.0 - 94.0 fL    MCH 30.1 27.0 - 31.0 pg    MCHC 33.0 33.0 - 36.0 g/dL    RDW 13.2 11.5 - 17.0 %    Platelet 213 130 - 400 x10(3)/mcL    MPV 9.9 7.4 - 10.4 fL    Neut % 57.6 %    Lymph % 31.3 %    Mono % 8.8 %    Eos % 1.6 %    Basophil % 0.5 %    Imm Grans % 0.2 %    Neut # 2.54 2.1 - 9.2 x10(3)/mcL    Lymph # 1.38 0.6 - 4.6 x10(3)/mcL    Mono # 0.39 0.1 - 1.3 x10(3)/mcL    Eos # 0.07 0 - 0.9 x10(3)/mcL    Baso # 0.02 <=0.2 x10(3)/mcL    Imm Gran # 0.01 0.00 - 0.04 x10(3)/mcL    NRBC% 0.0 %          Assessment & Plan:     Active Problem List with Overview Notes    Diagnosis Date Noted    Contact dermatitis 01/25/2025    Hypertension 01/30/2024    Rosacea 01/03/2023    Colon cancer screening 09/13/2022    Wellness examination 06/13/2022    Attention deficit hyperactivity disorder (ADHD) 06/13/2022    Erectile dysfunction 06/13/2022    Hyperlipidemia 06/13/2022       1. Attention deficit hyperactivity disorder (ADHD), unspecified ADHD type  Assessment & Plan:  On adderall 30 MG daily.     [facility] reviewed.  Controlled substance policy signed 1/2025    Prescriptions printed for August, September, and october per patient request. He will come to office today to pick them up. They are in an envelope at the front office.   Reminded patient again that if printed prescriptions are lost, misplaced or stolen, they cannot be replaced.       Reminded patient this is a controlled medication and must be seen q3 months  while on.  Patient is agreeable to plan and verbalized understanding    UDS 4/2025 appropriately positive for amphetamines.     Orders:  -     Discontinue: dextroamphetamine-amphetamine (ADDERALL) 30 mg Tab; Take 1 tablet (30 mg total) by mouth once daily.  Dispense: 30 tablet; Refill: 0  -     dextroamphetamine-amphetamine (ADDERALL) 30 mg Tab; Take 1 tablet (30 mg total) by mouth once daily.  Dispense: 30 tablet; Refill: 0  -     dextroamphetamine-amphetamine (ADDERALL) 30 mg Tab; Take 1 tablet (30 mg total) by mouth once daily.  Dispense: 30 tablet; Refill: 0  -     dextroamphetamine-amphetamine (ADDERALL) 30 mg Tab; Take 1 tablet (30 mg total) by mouth once daily.  Dispense: 30 tablet; Refill: 0         Follow up in about 3 months (around 10/25/2025) for ADHD virtual visit.        [1]   Current Outpatient Medications on File Prior to Visit   Medication Sig Dispense Refill    amLODIPine (NORVASC) 5 MG tablet Take 1 tablet (5 mg total) by mouth once daily. 90 tablet 3    atorvastatin (LIPITOR) 20 MG tablet Take 1 tablet (20 mg total) by mouth once daily. 90 tablet 3    calcipotriene (DOVONOX) 0.005 % cream Apply topically 2 (two) times daily.      hydrocortisone 2.5 % cream Apply topically 2 (two) times daily. To affected area as directed. 28 g 5    meloxicam (MOBIC) 15 MG tablet Take 1 tablet (15 mg total) by mouth once daily. With food as needed for pain 30 tablet 5    sildenafiL (VIAGRA) 100 MG tablet Take 1 tablet (100 mg total) by mouth daily as needed for Erectile Dysfunction. 10 tablet 11    triamcinolone acetonide 0.1% (KENALOG) 0.1 % cream Apply topically 2 (two) times daily. 80 g 0    [DISCONTINUED] dextroamphetamine-amphetamine (ADDERALL) 30 mg Tab Take 1 tablet (30 mg total) by mouth once daily. 30 tablet 0    [DISCONTINUED] dextroamphetamine-amphetamine (ADDERALL) 30 mg Tab Take 1 tablet (30 mg total) by mouth once daily. 30 tablet 0     No current facility-administered medications on file prior to  visit.